# Patient Record
Sex: FEMALE | Race: OTHER | HISPANIC OR LATINO | URBAN - METROPOLITAN AREA
[De-identification: names, ages, dates, MRNs, and addresses within clinical notes are randomized per-mention and may not be internally consistent; named-entity substitution may affect disease eponyms.]

---

## 2022-04-11 ENCOUNTER — EMERGENCY (EMERGENCY)
Facility: HOSPITAL | Age: 22
LOS: 1 days | Discharge: HOME | End: 2022-04-11
Attending: EMERGENCY MEDICINE
Payer: MEDICAID

## 2022-04-11 VITALS
RESPIRATION RATE: 20 BRPM | SYSTOLIC BLOOD PRESSURE: 114 MMHG | TEMPERATURE: 98 F | WEIGHT: 139.99 LBS | DIASTOLIC BLOOD PRESSURE: 73 MMHG | HEART RATE: 83 BPM | HEIGHT: 65 IN | OXYGEN SATURATION: 99 %

## 2022-04-11 VITALS
RESPIRATION RATE: 16 BRPM | OXYGEN SATURATION: 100 % | SYSTOLIC BLOOD PRESSURE: 117 MMHG | DIASTOLIC BLOOD PRESSURE: 70 MMHG | HEART RATE: 60 BPM | TEMPERATURE: 98 F

## 2022-04-11 LAB
ALBUMIN SERPL ELPH-MCNC: 4.5 G/DL — SIGNIFICANT CHANGE UP (ref 3.3–5)
ALP SERPL-CCNC: 75 U/L — SIGNIFICANT CHANGE UP (ref 40–120)
ALT FLD-CCNC: 14 U/L — SIGNIFICANT CHANGE UP (ref 10–45)
ANION GAP SERPL CALC-SCNC: 11 MMOL/L — SIGNIFICANT CHANGE UP (ref 5–17)
APPEARANCE UR: CLEAR — SIGNIFICANT CHANGE UP
AST SERPL-CCNC: 21 U/L — SIGNIFICANT CHANGE UP (ref 10–40)
BACTERIA # UR AUTO: NEGATIVE — SIGNIFICANT CHANGE UP
BASOPHILS # BLD AUTO: 0.03 K/UL — SIGNIFICANT CHANGE UP (ref 0–0.2)
BASOPHILS NFR BLD AUTO: 0.4 % — SIGNIFICANT CHANGE UP (ref 0–2)
BILIRUB SERPL-MCNC: 0.2 MG/DL — SIGNIFICANT CHANGE UP (ref 0.2–1.2)
BILIRUB UR-MCNC: NEGATIVE — SIGNIFICANT CHANGE UP
BUN SERPL-MCNC: 14 MG/DL — SIGNIFICANT CHANGE UP (ref 7–23)
CALCIUM SERPL-MCNC: 9.4 MG/DL — SIGNIFICANT CHANGE UP (ref 8.4–10.5)
CHLORIDE SERPL-SCNC: 105 MMOL/L — SIGNIFICANT CHANGE UP (ref 96–108)
CO2 SERPL-SCNC: 23 MMOL/L — SIGNIFICANT CHANGE UP (ref 22–31)
COLOR SPEC: SIGNIFICANT CHANGE UP
CREAT SERPL-MCNC: 0.55 MG/DL — SIGNIFICANT CHANGE UP (ref 0.5–1.3)
DIFF PNL FLD: NEGATIVE — SIGNIFICANT CHANGE UP
EGFR: 134 ML/MIN/1.73M2 — SIGNIFICANT CHANGE UP
EOSINOPHIL # BLD AUTO: 0.02 K/UL — SIGNIFICANT CHANGE UP (ref 0–0.5)
EOSINOPHIL NFR BLD AUTO: 0.3 % — SIGNIFICANT CHANGE UP (ref 0–6)
EPI CELLS # UR: 3 /HPF — SIGNIFICANT CHANGE UP
FLUAV H1 2009 PAND RNA SPEC QL NAA+PROBE: DETECTED
GLUCOSE SERPL-MCNC: 96 MG/DL — SIGNIFICANT CHANGE UP (ref 70–99)
GLUCOSE UR QL: NEGATIVE — SIGNIFICANT CHANGE UP
HCG UR QL: NEGATIVE — SIGNIFICANT CHANGE UP
HCT VFR BLD CALC: 37.9 % — SIGNIFICANT CHANGE UP (ref 34.5–45)
HGB BLD-MCNC: 12.2 G/DL — SIGNIFICANT CHANGE UP (ref 11.5–15.5)
HYALINE CASTS # UR AUTO: 1 /LPF — SIGNIFICANT CHANGE UP (ref 0–2)
IMM GRANULOCYTES NFR BLD AUTO: 0.4 % — SIGNIFICANT CHANGE UP (ref 0–1.5)
KETONES UR-MCNC: NEGATIVE — SIGNIFICANT CHANGE UP
LEUKOCYTE ESTERASE UR-ACNC: NEGATIVE — SIGNIFICANT CHANGE UP
LIDOCAIN IGE QN: 26 U/L — SIGNIFICANT CHANGE UP (ref 7–60)
LYMPHOCYTES # BLD AUTO: 1.76 K/UL — SIGNIFICANT CHANGE UP (ref 1–3.3)
LYMPHOCYTES # BLD AUTO: 23.2 % — SIGNIFICANT CHANGE UP (ref 13–44)
MAGNESIUM SERPL-MCNC: 2 MG/DL — SIGNIFICANT CHANGE UP (ref 1.6–2.6)
MCHC RBC-ENTMCNC: 28 PG — SIGNIFICANT CHANGE UP (ref 27–34)
MCHC RBC-ENTMCNC: 32.2 GM/DL — SIGNIFICANT CHANGE UP (ref 32–36)
MCV RBC AUTO: 87.1 FL — SIGNIFICANT CHANGE UP (ref 80–100)
MONOCYTES # BLD AUTO: 0.62 K/UL — SIGNIFICANT CHANGE UP (ref 0–0.9)
MONOCYTES NFR BLD AUTO: 8.2 % — SIGNIFICANT CHANGE UP (ref 2–14)
NEUTROPHILS # BLD AUTO: 5.13 K/UL — SIGNIFICANT CHANGE UP (ref 1.8–7.4)
NEUTROPHILS NFR BLD AUTO: 67.5 % — SIGNIFICANT CHANGE UP (ref 43–77)
NITRITE UR-MCNC: NEGATIVE — SIGNIFICANT CHANGE UP
NRBC # BLD: 0 /100 WBCS — SIGNIFICANT CHANGE UP (ref 0–0)
PH UR: 7.5 — SIGNIFICANT CHANGE UP (ref 5–8)
PLATELET # BLD AUTO: 297 K/UL — SIGNIFICANT CHANGE UP (ref 150–400)
POTASSIUM SERPL-MCNC: 4.6 MMOL/L — SIGNIFICANT CHANGE UP (ref 3.5–5.3)
POTASSIUM SERPL-SCNC: 4.6 MMOL/L — SIGNIFICANT CHANGE UP (ref 3.5–5.3)
PROCALCITONIN SERPL-MCNC: 0.06 NG/ML — SIGNIFICANT CHANGE UP (ref 0.02–0.1)
PROT SERPL-MCNC: 7.3 G/DL — SIGNIFICANT CHANGE UP (ref 6–8.3)
PROT UR-MCNC: NEGATIVE — SIGNIFICANT CHANGE UP
RAPID RVP RESULT: DETECTED
RBC # BLD: 4.35 M/UL — SIGNIFICANT CHANGE UP (ref 3.8–5.2)
RBC # FLD: 12.3 % — SIGNIFICANT CHANGE UP (ref 10.3–14.5)
RBC CASTS # UR COMP ASSIST: 1 /HPF — SIGNIFICANT CHANGE UP (ref 0–4)
SARS-COV-2 RNA SPEC QL NAA+PROBE: SIGNIFICANT CHANGE UP
SODIUM SERPL-SCNC: 139 MMOL/L — SIGNIFICANT CHANGE UP (ref 135–145)
SP GR SPEC: 1.01 — SIGNIFICANT CHANGE UP (ref 1.01–1.02)
UROBILINOGEN FLD QL: NEGATIVE — SIGNIFICANT CHANGE UP
WBC # BLD: 7.59 K/UL — SIGNIFICANT CHANGE UP (ref 3.8–10.5)
WBC # FLD AUTO: 7.59 K/UL — SIGNIFICANT CHANGE UP (ref 3.8–10.5)
WBC UR QL: 5 /HPF — SIGNIFICANT CHANGE UP (ref 0–5)

## 2022-04-11 PROCEDURE — 80053 COMPREHEN METABOLIC PANEL: CPT

## 2022-04-11 PROCEDURE — 83735 ASSAY OF MAGNESIUM: CPT

## 2022-04-11 PROCEDURE — 96374 THER/PROPH/DIAG INJ IV PUSH: CPT

## 2022-04-11 PROCEDURE — 85025 COMPLETE CBC W/AUTO DIFF WBC: CPT

## 2022-04-11 PROCEDURE — 0225U NFCT DS DNA&RNA 21 SARSCOV2: CPT

## 2022-04-11 PROCEDURE — 99285 EMERGENCY DEPT VISIT HI MDM: CPT | Mod: 25

## 2022-04-11 PROCEDURE — 83690 ASSAY OF LIPASE: CPT

## 2022-04-11 PROCEDURE — 81001 URINALYSIS AUTO W/SCOPE: CPT

## 2022-04-11 PROCEDURE — 87086 URINE CULTURE/COLONY COUNT: CPT

## 2022-04-11 PROCEDURE — 71046 X-RAY EXAM CHEST 2 VIEWS: CPT

## 2022-04-11 PROCEDURE — 93010 ELECTROCARDIOGRAM REPORT: CPT

## 2022-04-11 PROCEDURE — 93005 ELECTROCARDIOGRAM TRACING: CPT

## 2022-04-11 PROCEDURE — 96375 TX/PRO/DX INJ NEW DRUG ADDON: CPT

## 2022-04-11 PROCEDURE — 81025 URINE PREGNANCY TEST: CPT

## 2022-04-11 PROCEDURE — 84145 PROCALCITONIN (PCT): CPT

## 2022-04-11 PROCEDURE — 36415 COLL VENOUS BLD VENIPUNCTURE: CPT

## 2022-04-11 PROCEDURE — 71046 X-RAY EXAM CHEST 2 VIEWS: CPT | Mod: 26

## 2022-04-11 RX ORDER — SODIUM CHLORIDE 9 MG/ML
1000 INJECTION, SOLUTION INTRAVENOUS
Refills: 0 | Status: DISCONTINUED | OUTPATIENT
Start: 2022-04-11 | End: 2022-04-15

## 2022-04-11 RX ORDER — FAMOTIDINE 10 MG/ML
20 INJECTION INTRAVENOUS ONCE
Refills: 0 | Status: COMPLETED | OUTPATIENT
Start: 2022-04-11 | End: 2022-04-11

## 2022-04-11 RX ORDER — KETOROLAC TROMETHAMINE 30 MG/ML
15 SYRINGE (ML) INJECTION ONCE
Refills: 0 | Status: DISCONTINUED | OUTPATIENT
Start: 2022-04-11 | End: 2022-04-11

## 2022-04-11 RX ORDER — ONDANSETRON 8 MG/1
4 TABLET, FILM COATED ORAL ONCE
Refills: 0 | Status: COMPLETED | OUTPATIENT
Start: 2022-04-11 | End: 2022-04-11

## 2022-04-11 RX ORDER — SODIUM CHLORIDE 9 MG/ML
1000 INJECTION, SOLUTION INTRAVENOUS ONCE
Refills: 0 | Status: COMPLETED | OUTPATIENT
Start: 2022-04-11 | End: 2022-04-11

## 2022-04-11 RX ADMIN — SODIUM CHLORIDE 4000 MILLILITER(S): 9 INJECTION, SOLUTION INTRAVENOUS at 17:26

## 2022-04-11 RX ADMIN — Medication 15 MILLIGRAM(S): at 17:16

## 2022-04-11 RX ADMIN — FAMOTIDINE 20 MILLIGRAM(S): 10 INJECTION INTRAVENOUS at 17:15

## 2022-04-11 RX ADMIN — ONDANSETRON 4 MILLIGRAM(S): 8 TABLET, FILM COATED ORAL at 17:15

## 2022-04-11 NOTE — ED PROVIDER NOTE - CARE PLAN
1 Principal Discharge DX:	URI with cough and congestion  Secondary Diagnosis:	Near syncope  Secondary Diagnosis:	Mild dehydration

## 2022-04-11 NOTE — ED PROVIDER NOTE - PROGRESS NOTE DETAILS
patient reassesed, feeling improved now ambulates without feeling faint, attempting PO at this time, awaiting urine preg and then stable for DC - Janay Harrison PA-C dc instructions with  fly [id number] discussed importance of hydration, symptom management and return precautions - Janay Harrison PA-C

## 2022-04-11 NOTE — ED PROVIDER NOTE - ATTENDING CONTRIBUTION TO CARE
------------ATTENDING NOTE------------   pt c/o 4 days of nasal congestion, clear rhinorrhea, unproductive cough, nausea, decreased PO, diffuse muscle aches, subjective fevers, likely viral process, dehydrated, feeling lightheaded at times standing, no underlying medical disease, awaiting labs/imaging and close reassessments -->  - Leander Ruvalcaba MD   ----------------------------------------------

## 2022-04-11 NOTE — ED PROVIDER NOTE - NSFOLLOWUPINSTRUCTIONS_ED_ALL_ED_FT
stay hydrated  take tylenol for fever, take as indicated on packaging and ensure you read the side effects  take motrin for fever and body aches, take as indicated on packaging and ensure you read the side effects  read the attached information regarding viral illnesses provided for you in Welsh  someone will call you with your viral panel results  return to the ED for severe worsening headache, profuse vomiting or inability to tolerate food and drink, fevers not controlled by tylenol and motrin or new concerning symptoms    Mantente hidratado    tome tylenol para la fiebre, tómelo geneva se indica en el empaque y asegúrese de leer los efectos secundarios    tome motrin para la fiebre y los chelsea corporales, tómelo geneva se indica en el empaque y asegúrese de leer los efectos secundarios    russel la información adjunta sobre enfermedades virales proporcionada para usted en español    alguien te llamará con los resultados de tu panel viral    volver al servicio de urgencias por dolor de stan que empeora gravemente, vómitos profusos o incapacidad para tolerar alimentos y bebidas, fiebres que no se controlan con tylenol y motrin o nuevos síntomas preocupantes stay hydrated  take tylenol for fever, take as indicated on packaging and ensure you read the side effects  take motrin for fever and body aches, take as indicated on packaging and ensure you read the side effects  read the attached information regarding viral illnesses provided for you in Turkish  return to the ED for severe worsening headache, profuse vomiting or inability to tolerate food and drink, fevers not controlled by tylenol and motrin or new concerning symptoms    Mantente hidratado  tome tylenol para la fiebre, tómelo geneva se indica en el empaque y asegúrese de leer los efectos secundarios  tome motrin para la fiebre y los chelsea corporales, tómelo geneva se indica en el empaque y asegúrese de leer los efectos secundarios  russel la información adjunta sobre enfermedades virales proporcionada para usted en español  volver al servicio de urgencias por dolor de stan que empeora gravemente, vómitos profusos o incapacidad para tolerar alimentos y bebidas, fiebres que no se controlan con tylenol y motrin o nuevos síntomas preocupantes

## 2022-04-11 NOTE — ED PROVIDER NOTE - WET READ LAUNCH FT
I have reviewed discharge instructions with the parent. The parent verbalized understanding. Patient armband removed and shredded There are no Wet Read(s) to document. There is 1 Wet Read(s) to document.

## 2022-04-11 NOTE — ED PROVIDER NOTE - OBJECTIVE STATEMENT
20 y/o female no pmhx, no daily meds presents to the ED with myalgias, tactile fevers, headaches, sore throat, dry cough and nausea for 4 days. reports feeling faint on standing. no sick contacts or recent travel. covid vaccinated, no flu vaccine this year. patient did not take tylenol or motrin at home. 20 y/o female no pmhx, no daily meds presents to the ED with myalgias, tactile fevers, headaches, sore throat, dry cough and nausea for 4 days. reports feeling faint on standing. no sick contacts or recent travel. covid vaccinated, no flu vaccine this year. patient did not take tylenol or motrin at home.     : Shalonda [id number]

## 2022-04-11 NOTE — ED PROVIDER NOTE - NS ED ATTENDING STATEMENT MOD
This was a shared visit with the JULIAN. I reviewed and verified the documentation and independently performed the documented:

## 2022-04-11 NOTE — ED ADULT NURSE NOTE - NSIMPLEMENTINTERV_GEN_ALL_ED
Implemented All Universal Safety Interventions:  [place] to call system. Call bell, personal items and telephone within reach. Instruct patient to call for assistance. Room bathroom lighting operational. Non-slip footwear when patient is off stretcher. Physically safe environment: no spills, clutter or unnecessary equipment. Stretcher in lowest position, wheels locked, appropriate side rails in place.

## 2022-04-11 NOTE — ED PROVIDER NOTE - PATIENT PORTAL LINK FT
You can access the FollowMyHealth Patient Portal offered by [facility] by registering at the following website: [email or web address]. By joining [employer]’s FollowMyHealth portal, you will also be able to view your health information using other applications (apps) compatible with our system.

## 2022-04-11 NOTE — ED ADULT NURSE NOTE - OBJECTIVE STATEMENT
pt is a 20yo female no PMH, presenting to the ED complaining of generalized body pain and fatigue. pt complaining of fevers, chills, headaches, sore throat, and nausea for the past 4 days. pt A&Ox3 gross neuro intact, pt is Andorran speaking with no difficulty speaking in complete sentences, pulses x 4, gonzalez x4, abdomen soft nontender nondistended, skin intact. pt complaining of all over body pain.

## 2022-04-12 LAB
CULTURE RESULTS: SIGNIFICANT CHANGE UP
SPECIMEN SOURCE: SIGNIFICANT CHANGE UP

## 2022-10-18 ENCOUNTER — OUTPATIENT (OUTPATIENT)
Dept: OUTPATIENT SERVICES | Facility: HOSPITAL | Age: 22
LOS: 1 days | End: 2022-10-18
Payer: SELF-PAY

## 2022-10-18 ENCOUNTER — APPOINTMENT (OUTPATIENT)
Dept: OBGYN | Facility: CLINIC | Age: 22
End: 2022-10-18

## 2022-10-18 ENCOUNTER — LABORATORY RESULT (OUTPATIENT)
Age: 22
End: 2022-10-18

## 2022-10-18 VITALS
HEIGHT: 61 IN | WEIGHT: 120 LBS | DIASTOLIC BLOOD PRESSURE: 70 MMHG | BODY MASS INDEX: 22.66 KG/M2 | SYSTOLIC BLOOD PRESSURE: 104 MMHG

## 2022-10-18 DIAGNOSIS — N76.0 ACUTE VAGINITIS: ICD-10-CM

## 2022-10-18 PROCEDURE — 99203 OFFICE O/P NEW LOW 30 MIN: CPT | Mod: 25

## 2022-10-18 PROCEDURE — 88141 CYTOPATH C/V INTERPRET: CPT

## 2022-10-18 NOTE — DISCUSSION/SUMMARY
[FreeTextEntry1] : 21yo - here for confirmation of pregnancy\par \par - Viable IUP  +FH  CRl )7.0wks) consistent with LMP (7.5wks)\par - [ ]pnv given\par - [ ]pap/ gc/ct collected\par \par RTC for PCAP\par \par [place], PAC

## 2022-10-18 NOTE — PHYSICAL EXAM
[FreeTextEntry1] : PA-S,   FOB present as well [Labia Majora] : normal [Labia Minora] : normal [No Bleeding] : There was no active vaginal bleeding [Normal] : normal [Tenderness] : nontender [Uterine Adnexae] : normal

## 2022-10-18 NOTE — PROCEDURE
[Cervical Pap Smear] : cervical Pap smear [Liquid Base] : liquid base [GC Chlamydia Culture] : GC Chlamydia Culture [Transvaginal OB Sonogram] : Transvaginal OB Sonogram [Intrauterine Pregnancy] : intrauterine pregnancy [Yolk Sac] : yolk sac present [Fetal Heart] : fetal heart present [CRL: ___ (mm)] : CRL - [unfilled]Umm [Current GA by Sonogram: ___ (wks)] : Current GA by Sonogram: [unfilled]Uwks [___ day(s)] : [unfilled] days

## 2022-10-18 NOTE — REASON FOR VISIT
[Initial] : an initial consultation for [[employer] ] : provided by [employer]   [Time Spent: ____ minutes] : Total time spent using  services: [unfilled] minutes. The patient's primary language is not English thus required  services. [TWNoteComboBox1] : Moldovan

## 2022-10-18 NOTE — HISTORY OF PRESENT ILLNESS
[FreeTextEntry1] : 21yo  (LMP 22) presents as new pt to confirm pregnancy.  Unplanned, desired.  pt with regular monthly menses.  Pt denies VB.  First gyn exam\par \par gynhx: never had pap, denies stds\par Pmhx: denies\par Shx: denies\par Meds: denies\par ALL: KNDA\par Sochx: denies tob/etoh/drugs\par Famhx: denies all

## 2022-10-19 LAB
C TRACH RRNA SPEC QL NAA+PROBE: SIGNIFICANT CHANGE UP
N GONORRHOEA RRNA SPEC QL NAA+PROBE: SIGNIFICANT CHANGE UP
SPECIMEN SOURCE: SIGNIFICANT CHANGE UP

## 2022-10-20 DIAGNOSIS — Z32.00 ENCOUNTER FOR PREGNANCY TEST, RESULT UNKNOWN: ICD-10-CM

## 2022-10-20 DIAGNOSIS — Z11.3 ENCOUNTER FOR SCREENING FOR INFECTIONS WITH A PREDOMINANTLY SEXUAL MODE OF TRANSMISSION: ICD-10-CM

## 2022-10-24 ENCOUNTER — LABORATORY RESULT (OUTPATIENT)
Age: 22
End: 2022-10-24

## 2022-10-24 LAB — CYTOLOGY SPEC DOC CYTO: SIGNIFICANT CHANGE UP

## 2022-10-24 PROCEDURE — 87624 HPV HI-RISK TYP POOLED RSLT: CPT

## 2022-10-24 PROCEDURE — T1013: CPT

## 2022-10-24 PROCEDURE — 87491 CHLMYD TRACH DNA AMP PROBE: CPT

## 2022-10-24 PROCEDURE — 87591 N.GONORRHOEAE DNA AMP PROB: CPT

## 2022-10-24 PROCEDURE — 88175 CYTOPATH C/V AUTO FLUID REDO: CPT

## 2022-10-24 PROCEDURE — G0463: CPT

## 2022-10-24 PROCEDURE — 87625 HPV TYPES 16 & 18 ONLY: CPT

## 2022-11-03 ENCOUNTER — NON-APPOINTMENT (OUTPATIENT)
Age: 22
End: 2022-11-03

## 2022-11-09 ENCOUNTER — APPOINTMENT (OUTPATIENT)
Dept: OBGYN | Facility: CLINIC | Age: 22
End: 2022-11-09
Payer: MEDICAID

## 2022-11-09 ENCOUNTER — NON-APPOINTMENT (OUTPATIENT)
Age: 22
End: 2022-11-09

## 2022-11-09 ENCOUNTER — LABORATORY RESULT (OUTPATIENT)
Age: 22
End: 2022-11-09

## 2022-11-09 ENCOUNTER — OUTPATIENT (OUTPATIENT)
Dept: OUTPATIENT SERVICES | Facility: HOSPITAL | Age: 22
LOS: 1 days | End: 2022-11-09
Payer: MEDICAID

## 2022-11-09 VITALS — DIASTOLIC BLOOD PRESSURE: 60 MMHG | WEIGHT: 115 LBS | SYSTOLIC BLOOD PRESSURE: 100 MMHG

## 2022-11-09 DIAGNOSIS — Z34.80 ENCOUNTER FOR SUPERVISION OF OTHER NORMAL PREGNANCY, UNSPECIFIED TRIMESTER: ICD-10-CM

## 2022-11-09 DIAGNOSIS — Z32.00 ENCOUNTER FOR PREGNANCY TEST, RESULT UNKNOWN: ICD-10-CM

## 2022-11-09 DIAGNOSIS — O02.1 MISSED ABORTION: ICD-10-CM

## 2022-11-09 PROCEDURE — 85027 COMPLETE CBC AUTOMATED: CPT

## 2022-11-09 PROCEDURE — G0463: CPT

## 2022-11-09 PROCEDURE — 86901 BLOOD TYPING SEROLOGIC RH(D): CPT

## 2022-11-09 PROCEDURE — 90471 IMMUNIZATION ADMIN: CPT | Mod: NC

## 2022-11-09 PROCEDURE — 76830 TRANSVAGINAL US NON-OB: CPT | Mod: 26,NC

## 2022-11-09 PROCEDURE — 86900 BLOOD TYPING SEROLOGIC ABO: CPT

## 2022-11-09 PROCEDURE — 99213 OFFICE O/P EST LOW 20 MIN: CPT | Mod: 25

## 2022-11-09 PROCEDURE — 99213 OFFICE O/P EST LOW 20 MIN: CPT | Mod: 1L

## 2022-11-09 PROCEDURE — 86850 RBC ANTIBODY SCREEN: CPT

## 2022-11-09 PROCEDURE — 84702 CHORIONIC GONADOTROPIN TEST: CPT

## 2022-11-09 NOTE — HISTORY OF PRESENT ILLNESS
[Definite:  ___ (Date)] : the last menstrual period was [unfilled] [Sexually Active] : is sexually active [Monogamous] : is monogamous [Contraception] : does not use contraception

## 2022-11-09 NOTE — BEGINNING OF VISIT
[Patient] : patient [] :  [[employer] ] : provided by [employer]   [Time Spent: ____ minutes] : Total time spent using  services: [unfilled] minutes. The patient's primary language is not English thus required  services. [Interpreters_IDNumber] : [id number] [TWNoteComboBox1] : Ethiopian

## 2022-11-10 LAB
HCG SERPL-ACNC: HIGH MIU/ML
HCT VFR BLD CALC: 37.3 % — SIGNIFICANT CHANGE UP (ref 34.5–45)
HGB BLD-MCNC: 12 G/DL — SIGNIFICANT CHANGE UP (ref 11.5–15.5)
MCHC RBC-ENTMCNC: 28.5 PG — SIGNIFICANT CHANGE UP (ref 27–34)
MCHC RBC-ENTMCNC: 32.2 GM/DL — SIGNIFICANT CHANGE UP (ref 32–36)
MCV RBC AUTO: 88.6 FL — SIGNIFICANT CHANGE UP (ref 80–100)
PLATELET # BLD AUTO: 311 K/UL — SIGNIFICANT CHANGE UP (ref 150–400)
RBC # BLD: 4.21 M/UL — SIGNIFICANT CHANGE UP (ref 3.8–5.2)
RBC # FLD: 13.3 % — SIGNIFICANT CHANGE UP (ref 10.3–14.5)
WBC # BLD: 6.87 K/UL — SIGNIFICANT CHANGE UP (ref 3.8–10.5)
WBC # FLD AUTO: 6.87 K/UL — SIGNIFICANT CHANGE UP (ref 3.8–10.5)

## 2022-11-14 ENCOUNTER — OUTPATIENT (OUTPATIENT)
Dept: OUTPATIENT SERVICES | Facility: HOSPITAL | Age: 22
LOS: 1 days | End: 2022-11-14
Payer: MEDICAID

## 2022-11-14 ENCOUNTER — LABORATORY RESULT (OUTPATIENT)
Age: 22
End: 2022-11-14

## 2022-11-14 ENCOUNTER — TRANSCRIPTION ENCOUNTER (OUTPATIENT)
Age: 22
End: 2022-11-14

## 2022-11-14 ENCOUNTER — APPOINTMENT (OUTPATIENT)
Dept: OBGYN | Facility: CLINIC | Age: 22
End: 2022-11-14

## 2022-11-14 VITALS
WEIGHT: 117.95 LBS | HEIGHT: 61 IN | SYSTOLIC BLOOD PRESSURE: 105 MMHG | OXYGEN SATURATION: 100 % | RESPIRATION RATE: 18 BRPM | DIASTOLIC BLOOD PRESSURE: 68 MMHG | TEMPERATURE: 99 F | HEART RATE: 66 BPM

## 2022-11-14 VITALS — SYSTOLIC BLOOD PRESSURE: 104 MMHG | WEIGHT: 115 LBS | DIASTOLIC BLOOD PRESSURE: 62 MMHG

## 2022-11-14 DIAGNOSIS — Z01.818 ENCOUNTER FOR OTHER PREPROCEDURAL EXAMINATION: ICD-10-CM

## 2022-11-14 DIAGNOSIS — K08.409 PARTIAL LOSS OF TEETH, UNSPECIFIED CAUSE, UNSPECIFIED CLASS: Chronic | ICD-10-CM

## 2022-11-14 DIAGNOSIS — O02.1 MISSED ABORTION: ICD-10-CM

## 2022-11-14 DIAGNOSIS — N76.0 ACUTE VAGINITIS: ICD-10-CM

## 2022-11-14 LAB
BLD GP AB SCN SERPL QL: NEGATIVE — SIGNIFICANT CHANGE UP
HCT VFR BLD CALC: 35.2 % — SIGNIFICANT CHANGE UP (ref 34.5–45)
HGB BLD-MCNC: 11.7 G/DL — SIGNIFICANT CHANGE UP (ref 11.5–15.5)
MCHC RBC-ENTMCNC: 28.7 PG — SIGNIFICANT CHANGE UP (ref 27–34)
MCHC RBC-ENTMCNC: 33.2 GM/DL — SIGNIFICANT CHANGE UP (ref 32–36)
MCV RBC AUTO: 86.5 FL — SIGNIFICANT CHANGE UP (ref 80–100)
NRBC # BLD: 0 /100 WBCS — SIGNIFICANT CHANGE UP (ref 0–0)
PLATELET # BLD AUTO: 284 K/UL — SIGNIFICANT CHANGE UP (ref 150–400)
RBC # BLD: 4.07 M/UL — SIGNIFICANT CHANGE UP (ref 3.8–5.2)
RBC # FLD: 12.9 % — SIGNIFICANT CHANGE UP (ref 10.3–14.5)
RH IG SCN BLD-IMP: POSITIVE — SIGNIFICANT CHANGE UP
SARS-COV-2 RNA SPEC QL NAA+PROBE: SIGNIFICANT CHANGE UP
WBC # BLD: 6.22 K/UL — SIGNIFICANT CHANGE UP (ref 3.8–10.5)
WBC # FLD AUTO: 6.22 K/UL — SIGNIFICANT CHANGE UP (ref 3.8–10.5)

## 2022-11-14 PROCEDURE — 86850 RBC ANTIBODY SCREEN: CPT

## 2022-11-14 PROCEDURE — 86901 BLOOD TYPING SEROLOGIC RH(D): CPT

## 2022-11-14 PROCEDURE — 99214 OFFICE O/P EST MOD 30 MIN: CPT | Mod: GC,25

## 2022-11-14 PROCEDURE — 85027 COMPLETE CBC AUTOMATED: CPT

## 2022-11-14 PROCEDURE — U0003: CPT

## 2022-11-14 PROCEDURE — G0463: CPT

## 2022-11-14 PROCEDURE — 76815 OB US LIMITED FETUS(S): CPT

## 2022-11-14 PROCEDURE — 86900 BLOOD TYPING SEROLOGIC ABO: CPT

## 2022-11-14 PROCEDURE — U0005: CPT

## 2022-11-14 PROCEDURE — 76815 OB US LIMITED FETUS(S): CPT | Mod: 26,NC

## 2022-11-14 PROCEDURE — 36415 COLL VENOUS BLD VENIPUNCTURE: CPT

## 2022-11-14 RX ORDER — LIDOCAINE HCL 20 MG/ML
0.2 VIAL (ML) INJECTION ONCE
Refills: 0 | Status: DISCONTINUED | OUTPATIENT
Start: 2022-11-15 | End: 2022-11-29

## 2022-11-14 RX ORDER — SODIUM CHLORIDE 9 MG/ML
1000 INJECTION, SOLUTION INTRAVENOUS
Refills: 0 | Status: DISCONTINUED | OUTPATIENT
Start: 2022-11-15 | End: 2022-11-29

## 2022-11-14 NOTE — H&P PST ADULT - HISTORY OF PRESENT ILLNESS
22yr old female  LMP 22 presents to PST for a scheduled D&C w/ Ultrasound Guidance on 11/15/22 for a missed ; no fetal heart beat @7wks. No PMH. No PSH. Denies known hx of COVID or recent fevers, chills, cough, chest pain or SOB and feels well otherwise. Vaccinated x2. COVID testing completed in PST today.

## 2022-11-14 NOTE — REASON FOR VISIT
[[employer] ] : provided by [employer]   [Follow-Up] : a follow-up evaluation of [FreeTextEntry2] : missed  [Interpreters_IDNumber] : [id number] [Interpreters_FullName] : Ursula

## 2022-11-14 NOTE — H&P PST ADULT - NSANTHOSAYNRD_GEN_A_CORE
No. SEBLE screening performed.  STOP BANG Legend: 0-2 = LOW Risk; 3-4 = INTERMEDIATE Risk; 5-8 = HIGH Risk

## 2022-11-14 NOTE — H&P PST ADULT - PROBLEM SELECTOR PLAN 1
Scheduled for sx on 11/15/2022. Surgical instructions reviewed w/ pt, COVID testing completed in PST today.

## 2022-11-14 NOTE — H&P PST ADULT - NSANTHSNORERD_ENT_A_CORE
GENITOURINARY - ADULT    • Absence of urinary retention Progressing        Impaired Functional Mobility    • Achieve highest/safest level of mobility/gait Progressing        Impaired Swallowing    • Minimize aspiration risk Progressing        PAIN - ADULT No

## 2022-11-14 NOTE — HISTORY OF PRESENT ILLNESS
[FreeTextEntry1] : 23yo  @ 11 4/7 weeks measuring 7 0/7 weeks on u/s  diagnosed with missed  on  presenting with her partner for pre-surgical evaluation. Patient denies vaginal bleeding, fevers, chills, N/V. She has intermittent sharp abdominal pain but has not taken any medication for it. She has a D&C scheduled for tomorrow 11/15. She has attended PST. Patient accompanied by her partner today.\par \par \par GYNhx:\par denies abnl pap smears\par  denies fibroids/cysts/enodmetriosis\par denies STIs

## 2022-11-15 ENCOUNTER — OUTPATIENT (OUTPATIENT)
Dept: OUTPATIENT SERVICES | Facility: HOSPITAL | Age: 22
LOS: 1 days | End: 2022-11-15
Payer: MEDICAID

## 2022-11-15 ENCOUNTER — RESULT REVIEW (OUTPATIENT)
Age: 22
End: 2022-11-15

## 2022-11-15 ENCOUNTER — TRANSCRIPTION ENCOUNTER (OUTPATIENT)
Age: 22
End: 2022-11-15

## 2022-11-15 ENCOUNTER — APPOINTMENT (OUTPATIENT)
Dept: OBGYN | Facility: CLINIC | Age: 22
End: 2022-11-15

## 2022-11-15 VITALS
HEART RATE: 50 BPM | SYSTOLIC BLOOD PRESSURE: 121 MMHG | OXYGEN SATURATION: 100 % | RESPIRATION RATE: 16 BRPM | DIASTOLIC BLOOD PRESSURE: 61 MMHG

## 2022-11-15 VITALS
SYSTOLIC BLOOD PRESSURE: 93 MMHG | RESPIRATION RATE: 18 BRPM | HEIGHT: 61 IN | OXYGEN SATURATION: 100 % | HEART RATE: 64 BPM | DIASTOLIC BLOOD PRESSURE: 61 MMHG | WEIGHT: 117.95 LBS | TEMPERATURE: 98 F

## 2022-11-15 DIAGNOSIS — O02.1 MISSED ABORTION: ICD-10-CM

## 2022-11-15 DIAGNOSIS — K08.409 PARTIAL LOSS OF TEETH, UNSPECIFIED CAUSE, UNSPECIFIED CLASS: Chronic | ICD-10-CM

## 2022-11-15 DIAGNOSIS — Z01.818 ENCOUNTER FOR OTHER PREPROCEDURAL EXAMINATION: ICD-10-CM

## 2022-11-15 LAB
BLD GP AB SCN SERPL QL: NEGATIVE — SIGNIFICANT CHANGE UP
RH IG SCN BLD-IMP: POSITIVE — SIGNIFICANT CHANGE UP

## 2022-11-15 PROCEDURE — 59820 CARE OF MISCARRIAGE: CPT

## 2022-11-15 PROCEDURE — 76998 US GUIDE INTRAOP: CPT | Mod: 26

## 2022-11-15 PROCEDURE — 86850 RBC ANTIBODY SCREEN: CPT

## 2022-11-15 PROCEDURE — 88264 CHROMOSOME ANALYSIS 20-25: CPT

## 2022-11-15 PROCEDURE — 88233 TISSUE CULTURE SKIN/BIOPSY: CPT

## 2022-11-15 PROCEDURE — 86901 BLOOD TYPING SEROLOGIC RH(D): CPT

## 2022-11-15 PROCEDURE — 88305 TISSUE EXAM BY PATHOLOGIST: CPT | Mod: 26

## 2022-11-15 PROCEDURE — 86900 BLOOD TYPING SEROLOGIC ABO: CPT

## 2022-11-15 PROCEDURE — 88280 CHROMOSOME KARYOTYPE STUDY: CPT

## 2022-11-15 PROCEDURE — 88305 TISSUE EXAM BY PATHOLOGIST: CPT

## 2022-11-15 RX ORDER — FENTANYL CITRATE 50 UG/ML
25 INJECTION INTRAVENOUS
Refills: 0 | Status: DISCONTINUED | OUTPATIENT
Start: 2022-11-15 | End: 2022-11-15

## 2022-11-15 RX ORDER — ONDANSETRON 8 MG/1
4 TABLET, FILM COATED ORAL ONCE
Refills: 0 | Status: DISCONTINUED | OUTPATIENT
Start: 2022-11-15 | End: 2022-11-29

## 2022-11-15 RX ADMIN — SODIUM CHLORIDE 100 MILLILITER(S): 9 INJECTION, SOLUTION INTRAVENOUS at 12:38

## 2022-11-15 NOTE — ASU DISCHARGE PLAN (ADULT/PEDIATRIC) - NURSING INSTRUCTIONS
Next dose of Tylenol will be on or after ____7:45pm_______ ,today/tonight and every 6 hours afterwards for pain management, do not take any Tylenol containing products until this time. Your first dose of Tylenol was given at _1:45pm__________. Do not exceed more than 4000mg of Tylenol in one 24 hour setting. If no contraindications, you may alternate with Ibuprofen 3 hours after dose of Tylenol. Ibuprofen can be taken every 6 hours. Next dose of ibuprofen can be taken at/after---8pm--- if needed for pain. First dose was given at 2pm.

## 2022-11-15 NOTE — ASU DISCHARGE PLAN (ADULT/PEDIATRIC) - CARE PROVIDER_API CALL
Lucille Tran)  OBSGYN  General  [address], [address]  [place] [address]  Phone: [phone]  Fax: [phone]  Established Patient  Follow Up Time: 2 weeks

## 2022-11-15 NOTE — BRIEF OPERATIVE NOTE - NSICDXBRIEFPROCEDURE_GEN_ALL_CORE_FT
PROCEDURES:  Dilation and curettage, uterus, with ultrasound guidance 15-Nov-2022 14:22:05  Belia Kahn

## 2022-11-15 NOTE — ASU DISCHARGE PLAN (ADULT/PEDIATRIC) - ASU DC SPECIAL INSTRUCTIONSFT
Discharge Instructions:  1. Diet: advance as tolerated  2. Activity limited by:   - no running, heavy lifting, strenuous exercise,   - nothing in vagina (bath, swim, intercourse, douching, tampons) for 2 weeks  3. Medications:   - Ibuprofen 600mg every 6 hours as needed for pain  - Acetaminophen 975mg every 6 hours as needed for pain  4. Follow-up appointment - 2 weeks. Please call the office upon discharge to schedule your appointment if you do not have one already.   5. Precautions:  - Call the office or go to the ED if you have any of the followin) Fever >100.4 that does not resolve  2) Intractable pain  3) Heavy bleeding

## 2022-11-15 NOTE — BRIEF OPERATIVE NOTE - OPERATION/FINDINGS
EUA: Anteverted uterus approx 8 weeks in size. External genitalia wnl. No adnexal masses  Manual vaccuum aspiration performed in standard fashion under sono guidance. Products of conception accounted for and appropriate for gestational age of demise.   Thin endometrial stripe noted at end of procedure

## 2022-11-15 NOTE — ASU PATIENT PROFILE, ADULT - FALL HARM RISK - UNIVERSAL INTERVENTIONS
Bed in lowest position, wheels locked, appropriate side rails in place/Call bell, personal items and telephone in reach/Instruct patient to call for assistance before getting out of bed or chair/Non-slip footwear when patient is out of bed/[place] to call system/Physically safe environment - no spills, clutter or unnecessary equipment/Purposeful Proactive Rounding/Room/bathroom lighting operational, light cord in reach

## 2022-11-15 NOTE — ASU DISCHARGE PLAN (ADULT/PEDIATRIC) - NS MD DC FALL RISK RISK
For information on Fall & Injury Prevention, visit: [email or web address] OR  [email or web address] OR  https://www.cdc.gov/steadi/patient.html

## 2022-11-17 ENCOUNTER — NON-APPOINTMENT (OUTPATIENT)
Age: 22
End: 2022-11-17

## 2022-11-19 DIAGNOSIS — O02.1 MISSED ABORTION: ICD-10-CM

## 2022-11-25 LAB — SURGICAL PATHOLOGY STUDY: SIGNIFICANT CHANGE UP

## 2022-12-01 LAB — CHROM ANALY OVERALL INTERP SPEC-IMP: SIGNIFICANT CHANGE UP

## 2022-12-10 NOTE — PROCEDURE
[Intrauterine Pregnancy] : intrauterine pregnancy [Yolk Sac] : yolk sac present [Fetal Heart] : no fetal heart [Current GA by Sonogram: ___ (wks)] : Current GA by Sonogram: [unfilled]Uwks [___ day(s)] : [unfilled] days Airway patent, Nasal mucosa clear. Mouth with normal mucosa. [FreeTextEntry1] : MAB

## 2022-12-27 ENCOUNTER — NON-APPOINTMENT (OUTPATIENT)
Age: 22
End: 2022-12-27

## 2022-12-28 LAB
MISCELLANEOUS TEST: NORMAL
PROC NAME: NORMAL

## 2023-01-04 ENCOUNTER — APPOINTMENT (OUTPATIENT)
Dept: OBGYN | Facility: CLINIC | Age: 23
End: 2023-01-04
Payer: MEDICAID

## 2023-01-04 VITALS
DIASTOLIC BLOOD PRESSURE: 68 MMHG | RESPIRATION RATE: 18 BRPM | OXYGEN SATURATION: 100 % | SYSTOLIC BLOOD PRESSURE: 102 MMHG | HEART RATE: 60 BPM

## 2023-01-04 PROCEDURE — 99214 OFFICE O/P EST MOD 30 MIN: CPT

## 2023-01-11 ENCOUNTER — OUTPATIENT (OUTPATIENT)
Dept: OUTPATIENT SERVICES | Facility: HOSPITAL | Age: 23
LOS: 1 days | End: 2023-01-11
Payer: MEDICAID

## 2023-01-11 ENCOUNTER — APPOINTMENT (OUTPATIENT)
Dept: OBGYN | Facility: CLINIC | Age: 23
End: 2023-01-11
Payer: MEDICAID

## 2023-01-11 VITALS — SYSTOLIC BLOOD PRESSURE: 108 MMHG | DIASTOLIC BLOOD PRESSURE: 70 MMHG | WEIGHT: 124 LBS

## 2023-01-11 DIAGNOSIS — R87.610 ATYPICAL SQUAMOUS CELLS OF UNDETERMINED SIGNIFICANCE ON CYTOLOGIC SMEAR OF CERVIX (ASC-US): ICD-10-CM

## 2023-01-11 DIAGNOSIS — Z34.90 ENCOUNTER FOR SUPERVISION OF NORMAL PREGNANCY, UNSPECIFIED, UNSPECIFIED TRIMESTER: ICD-10-CM

## 2023-01-11 DIAGNOSIS — K08.409 PARTIAL LOSS OF TEETH, UNSPECIFIED CAUSE, UNSPECIFIED CLASS: Chronic | ICD-10-CM

## 2023-01-11 DIAGNOSIS — O02.1 MISSED ABORTION: ICD-10-CM

## 2023-01-11 DIAGNOSIS — N76.0 ACUTE VAGINITIS: ICD-10-CM

## 2023-01-11 DIAGNOSIS — Z34.91 ENCOUNTER FOR SUPERVISION OF NORMAL PREGNANCY, UNSPECIFIED, FIRST TRIMESTER: ICD-10-CM

## 2023-01-11 DIAGNOSIS — Z09 ENCOUNTER FOR FOLLOW-UP EXAMINATION AFTER COMPLETED TREATMENT FOR CONDITIONS OTHER THAN MALIGNANT NEOPLASM: ICD-10-CM

## 2023-01-11 PROCEDURE — G0463: CPT

## 2023-01-11 PROCEDURE — 99213 OFFICE O/P EST LOW 20 MIN: CPT | Mod: GC

## 2023-01-12 DIAGNOSIS — R87.610 ATYPICAL SQUAMOUS CELLS OF UNDETERMINED SIGNIFICANCE ON CYTOLOGIC SMEAR OF CERVIX (ASC-US): ICD-10-CM

## 2023-01-12 NOTE — DISCUSSION/SUMMARY
[FreeTextEntry1] : 23yo  LMP 22 coming in for annual visit. Patient reports intermittent sharp LUQ pain since her D&C for missed  11/15/22.\par \par #HCM\par - 10/2022 pap ASCUS with neg HRHPV\par [] repeat pap in 1 year\par - using condoms for contraception\par \par #abdominal pain\par - Likely related to gas. Patient denies pain currently. \par - Recommend simethicone and pepcid\par \par d/w Dr. Kruse\par JAMES Augustin, PGY2

## 2023-01-12 NOTE — HISTORY OF PRESENT ILLNESS
[FreeTextEntry1] : 22 year  LMP 22 coming in for annual. Patient reports intermittent sharp LUQ pain ever since her D&C. She has not taken pain medication for it. She does not have the pain today. She is back to her regular activities s/p D&C on 11/15/22 for missed  at 11wga. Patient feels well and denies symptoms including: dysuria, abdominal pain, vaginal itching, burning, abnormal discharge. \par \par Last pap: Oct 2022 - ASCUS with neg HRHPV\par Last mammogram: not indicated\par Last declines STI screening. Patient satisfied with current form of birth control (condoms)\par \par Ob Hx: missed  s/p D&C 11/15/22\par Gyn Hx: denies\par Sexual Hx: monogamous with \par Med Hx: denies\par Surg Hx: denies\par Meds: none\par Allergies: NKDA

## 2023-01-12 NOTE — REASON FOR VISIT
[[employer] ] : provided by [employer]   [Interpreters_IDNumber] : [id number] [Interpreters_FullName] : Alessandra [TWNoteComboBox1] : Paraguayan

## 2023-01-12 NOTE — PHYSICAL EXAM
[Chaperone Present] : A chaperone was present in the examining room during all aspects of the physical examination [Soft] : soft [Non-tender] : non-tender [Non-distended] : non-distended [Labia Majora] : normal [Labia Minora] : normal [Normal] : normal [Uterine Adnexae] : normal [FreeTextEntry1] : Jacob

## 2023-03-22 ENCOUNTER — EMERGENCY (EMERGENCY)
Facility: HOSPITAL | Age: 23
LOS: 1 days | Discharge: HOME | End: 2023-03-22
Attending: EMERGENCY MEDICINE
Payer: MEDICAID

## 2023-03-22 VITALS
RESPIRATION RATE: 17 BRPM | OXYGEN SATURATION: 99 % | WEIGHT: 119.93 LBS | SYSTOLIC BLOOD PRESSURE: 97 MMHG | TEMPERATURE: 98 F | DIASTOLIC BLOOD PRESSURE: 63 MMHG | HEIGHT: 61 IN | HEART RATE: 73 BPM

## 2023-03-22 VITALS
TEMPERATURE: 98 F | SYSTOLIC BLOOD PRESSURE: 100 MMHG | DIASTOLIC BLOOD PRESSURE: 51 MMHG | RESPIRATION RATE: 18 BRPM | OXYGEN SATURATION: 99 % | HEART RATE: 70 BPM

## 2023-03-22 DIAGNOSIS — K08.409 PARTIAL LOSS OF TEETH, UNSPECIFIED CAUSE, UNSPECIFIED CLASS: Chronic | ICD-10-CM

## 2023-03-22 LAB
APPEARANCE UR: ABNORMAL
BACTERIA # UR AUTO: ABNORMAL
BILIRUB UR-MCNC: NEGATIVE — SIGNIFICANT CHANGE UP
COLOR SPEC: ABNORMAL
DIFF PNL FLD: ABNORMAL
EPI CELLS # UR: ABNORMAL
GLUCOSE UR QL: NEGATIVE — SIGNIFICANT CHANGE UP
HCG UR QL: NEGATIVE — SIGNIFICANT CHANGE UP
HYALINE CASTS # UR AUTO: 1 /LPF — SIGNIFICANT CHANGE UP (ref 0–7)
KETONES UR-MCNC: NEGATIVE — SIGNIFICANT CHANGE UP
LEUKOCYTE ESTERASE UR-ACNC: ABNORMAL
NITRITE UR-MCNC: NEGATIVE — SIGNIFICANT CHANGE UP
PH UR: 6 — SIGNIFICANT CHANGE UP (ref 5–8)
PROT UR-MCNC: ABNORMAL
RBC CASTS # UR COMP ASSIST: 10 /HPF — HIGH (ref 0–4)
SP GR SPEC: 1.02 — SIGNIFICANT CHANGE UP (ref 1.01–1.02)
UROBILINOGEN FLD QL: NEGATIVE — SIGNIFICANT CHANGE UP
WBC UR QL: >50

## 2023-03-22 PROCEDURE — 87186 SC STD MICRODIL/AGAR DIL: CPT

## 2023-03-22 PROCEDURE — 99285 EMERGENCY DEPT VISIT HI MDM: CPT | Mod: 25

## 2023-03-22 PROCEDURE — 76856 US EXAM PELVIC COMPLETE: CPT | Mod: 26

## 2023-03-22 PROCEDURE — 81001 URINALYSIS AUTO W/SCOPE: CPT

## 2023-03-22 PROCEDURE — 99284 EMERGENCY DEPT VISIT MOD MDM: CPT

## 2023-03-22 PROCEDURE — 87086 URINE CULTURE/COLONY COUNT: CPT

## 2023-03-22 PROCEDURE — 93975 VASCULAR STUDY: CPT

## 2023-03-22 PROCEDURE — 76856 US EXAM PELVIC COMPLETE: CPT

## 2023-03-22 PROCEDURE — 87077 CULTURE AEROBIC IDENTIFY: CPT

## 2023-03-22 PROCEDURE — 81025 URINE PREGNANCY TEST: CPT

## 2023-03-22 PROCEDURE — 93976 VASCULAR STUDY: CPT | Mod: 26,59

## 2023-03-22 RX ORDER — ACETAMINOPHEN 500 MG
975 TABLET ORAL ONCE
Refills: 0 | Status: COMPLETED | OUTPATIENT
Start: 2023-03-22 | End: 2023-03-22

## 2023-03-22 RX ORDER — CEFUROXIME AXETIL 250 MG
1 TABLET ORAL
Qty: 10 | Refills: 0
Start: 2023-03-22 | End: 2023-03-26

## 2023-03-22 RX ADMIN — Medication 975 MILLIGRAM(S): at 12:41

## 2023-03-22 NOTE — ED PROVIDER NOTE - PATIENT PORTAL LINK FT
You can access the FollowMyHealth Patient Portal offered by [facility] by registering at the following website: [email or web address]. By joining [employer]’s FollowMyHealth portal, you will also be able to view your health information using other applications (apps) compatible with our system.

## 2023-03-22 NOTE — ED PROVIDER NOTE - PHYSICAL EXAMINATION
CONSTITUTIONAL: Well appearing and in no apparent distress.  ENT: Airway patent, moist mucous membranes.   EYES: Pupils equal, round and reactive to light. EOMI. Conjunctiva normal appearing.   CARDIAC: Normal rate, regular rhythm.  Heart sounds S1, S2.    RESPIRATORY: Breath sounds clear and equal bilaterally.   GASTROINTESTINAL: Abdomen soft, non-tender, not distended. No rebound/gaurding. No CVAT bilaterally.   MUSCULOSKELETAL: Spine appears normal.  PELVIC: Performed by Dr. Daniels.   NEUROLOGICAL: Alert and oriented x3, no focal deficits, no motor or sensory deficits. 5/5 muscle strength throughout.  SKIN: Skin normal color, warm, dry and intact.   PSYCHIATRIC: Normal mood and affect.

## 2023-03-22 NOTE — ED PROVIDER NOTE - OBJECTIVE STATEMENT
21 yo F with a PMH of miscarriage 4mo (was 2 mo gestation) p/w lower abd cramping, dysuria described as a burning and stabbing sensation as well as urinary frequency x 4 days. Sxs constant and worsening since onset. Has never had before. Denies nausea, vomiting, fever, chills, chest pain, back/flank pain, hematuria, urgency, vaginal bleeding/odor/dc. No hx of STIs. Sexually active with . LMP 02/25

## 2023-03-22 NOTE — ED PROVIDER NOTE - ATTENDING APP SHARED VISIT CONTRIBUTION OF CARE
Attending MD Daniels:   I personally have seen and examined this patient.  Physician assistant note reviewed and agree on plan of care and except where noted.  See below for details.     Seen in [place], interviewed in Mongolian    22F with PMH/PSH including D&C a few months ago presents to the ED with burning on urination, lower abdominal cramping, increased urinary frequency for 4 days.  Reports that she has pain with urinating, increased frequency and urgency.  Denies hematuria.  LMP 2/25/23. Denies vaginal discharge.  Reports is sexually active with one male partner, denies history of STI.  Denies fevers. Reports subjective chills.  Denies nausea, vomiting, diarrhea, bloody or black stools.  Reports tolerating po.  Denies chest pain, shortness of breath, URI symptoms.  Denies previous episode.    Exam:   General: NAD  HENT: head NCAT, airway patent  Eyes: anicteric, no conjunctival injection   Lungs: lungs CTAB with good inspiratory effort, no wheezing, no rhonchi, no rales  Cardiac: +S1S2, no obvious m/r/g  GI: abdomen soft with +BS, NT, ND  : bilateral CVAT, chaperoned by KENIA Nolan, no CMT, bilateral adnexal tenderness  MSK: ranging neck and extremities freely  Neuro: moving all extremities spontaneously, nonfocal  Psych: normal mood and affect     A/P: 22F with dysuria, frequency, CVAT, will evaluate for but not limited to pyelo, will also consider ovarian cysts, history and clinical picture not consistent with torsion, TOA at this time, will obtain UA/UrCx, will give analgesia, will send for US, will Rx antibiotics as needed

## 2023-03-22 NOTE — ED PROVIDER NOTE - NSFOLLOWUPINSTRUCTIONS_ED_ALL_ED_FT
Se descubrió que tenía julian infección del tracto urinario. Por favor, asegúrese de xiao los antibióticos en la farmacia. Tómelo geneva se indica y complete todo el curso. Puede xiao Tylenol y/o Motrin según sea necesario para el dolor. Asegúrate de descansar e hidratarte. Llame al número a continuación para programar julian berny con el médico de atención primaria para el seguimiento. Se descubrió que tenía julian infección del tracto urinario. Por favor, asegúrese de xiao los antibióticos en la farmacia. Tómelo geneva se indica y complete todo el curso. Puede xiao Tylenol y/o Motrin según sea necesario para el dolor. Asegúrate de descansar e hidratarte. Llame al número a continuación para programar julian berny con el médico de atención primaria para el seguimiento.    [facility] at the [facility]  [address], [address], [place] [address]  Phone: [phone]

## 2023-03-24 NOTE — ED POST DISCHARGE NOTE - RESULT SUMMARY
3/24: Prelim ucx >100K E. coli and 50-99K Group B Strep. Pt discharge home on cefuroxime for UTI. Will await final sensitivities to determine need for contact vs appropriate care given. - Ángel Malave PA-C

## 2023-03-29 ENCOUNTER — APPOINTMENT (OUTPATIENT)
Dept: OBGYN | Facility: CLINIC | Age: 23
End: 2023-03-29

## 2023-07-18 ENCOUNTER — TRANSCRIPTION ENCOUNTER (OUTPATIENT)
Age: 23
End: 2023-07-18

## 2023-07-18 ENCOUNTER — INPATIENT (INPATIENT)
Facility: HOSPITAL | Age: 23
LOS: 1 days | Discharge: HOME | DRG: 342 | End: 2023-07-20
Attending: SURGERY | Admitting: SURGERY
Payer: MEDICAID

## 2023-07-18 VITALS
OXYGEN SATURATION: 99 % | RESPIRATION RATE: 18 BRPM | WEIGHT: 134.92 LBS | HEIGHT: 64 IN | SYSTOLIC BLOOD PRESSURE: 105 MMHG | HEART RATE: 64 BPM | DIASTOLIC BLOOD PRESSURE: 73 MMHG | TEMPERATURE: 98 F

## 2023-07-18 DIAGNOSIS — K08.409 PARTIAL LOSS OF TEETH, UNSPECIFIED CAUSE, UNSPECIFIED CLASS: Chronic | ICD-10-CM

## 2023-07-18 LAB
ALBUMIN SERPL ELPH-MCNC: 4.7 G/DL — SIGNIFICANT CHANGE UP (ref 3.3–5)
ALP SERPL-CCNC: 84 U/L — SIGNIFICANT CHANGE UP (ref 40–120)
ALT FLD-CCNC: 14 U/L — SIGNIFICANT CHANGE UP (ref 10–45)
ANION GAP SERPL CALC-SCNC: 10 MMOL/L — SIGNIFICANT CHANGE UP (ref 5–17)
APPEARANCE UR: CLEAR — SIGNIFICANT CHANGE UP
AST SERPL-CCNC: 19 U/L — SIGNIFICANT CHANGE UP (ref 10–40)
BASE EXCESS BLDV CALC-SCNC: -0.7 MMOL/L — SIGNIFICANT CHANGE UP (ref -2–3)
BASOPHILS # BLD AUTO: 0.38 K/UL — HIGH (ref 0–0.2)
BASOPHILS NFR BLD AUTO: 5.3 % — HIGH (ref 0–2)
BILIRUB SERPL-MCNC: 0.3 MG/DL — SIGNIFICANT CHANGE UP (ref 0.2–1.2)
BILIRUB UR-MCNC: NEGATIVE — SIGNIFICANT CHANGE UP
BUN SERPL-MCNC: 10 MG/DL — SIGNIFICANT CHANGE UP (ref 7–23)
CA-I SERPL-SCNC: 1.25 MMOL/L — SIGNIFICANT CHANGE UP (ref 1.15–1.33)
CALCIUM SERPL-MCNC: 9.6 MG/DL — SIGNIFICANT CHANGE UP (ref 8.4–10.5)
CHLORIDE BLDV-SCNC: 105 MMOL/L — SIGNIFICANT CHANGE UP (ref 96–108)
CHLORIDE SERPL-SCNC: 106 MMOL/L — SIGNIFICANT CHANGE UP (ref 96–108)
CO2 BLDV-SCNC: 27 MMOL/L — HIGH (ref 22–26)
CO2 SERPL-SCNC: 23 MMOL/L — SIGNIFICANT CHANGE UP (ref 22–31)
COLOR SPEC: YELLOW — SIGNIFICANT CHANGE UP
CREAT SERPL-MCNC: 0.56 MG/DL — SIGNIFICANT CHANGE UP (ref 0.5–1.3)
DIFF PNL FLD: NEGATIVE — SIGNIFICANT CHANGE UP
EGFR: 132 ML/MIN/1.73M2 — SIGNIFICANT CHANGE UP
EOSINOPHIL # BLD AUTO: 0.26 K/UL — SIGNIFICANT CHANGE UP (ref 0–0.5)
EOSINOPHIL NFR BLD AUTO: 3.6 % — SIGNIFICANT CHANGE UP (ref 0–6)
GAS PNL BLDV: 136 MMOL/L — SIGNIFICANT CHANGE UP (ref 136–145)
GAS PNL BLDV: SIGNIFICANT CHANGE UP
GAS PNL BLDV: SIGNIFICANT CHANGE UP
GLUCOSE BLDV-MCNC: 78 MG/DL — SIGNIFICANT CHANGE UP (ref 70–99)
GLUCOSE SERPL-MCNC: 80 MG/DL — SIGNIFICANT CHANGE UP (ref 70–99)
GLUCOSE UR QL: NEGATIVE — SIGNIFICANT CHANGE UP
HCG SERPL-ACNC: <2 MIU/ML — SIGNIFICANT CHANGE UP
HCO3 BLDV-SCNC: 26 MMOL/L — SIGNIFICANT CHANGE UP (ref 22–29)
HCT VFR BLD CALC: 37.8 % — SIGNIFICANT CHANGE UP (ref 34.5–45)
HCT VFR BLDA CALC: 39 % — SIGNIFICANT CHANGE UP (ref 34.5–46.5)
HGB BLD CALC-MCNC: 12.9 G/DL — SIGNIFICANT CHANGE UP (ref 11.7–16.1)
HGB BLD-MCNC: 12.2 G/DL — SIGNIFICANT CHANGE UP (ref 11.5–15.5)
KETONES UR-MCNC: NEGATIVE — SIGNIFICANT CHANGE UP
LACTATE BLDV-MCNC: 1.6 MMOL/L — SIGNIFICANT CHANGE UP (ref 0.5–2)
LEUKOCYTE ESTERASE UR-ACNC: NEGATIVE — SIGNIFICANT CHANGE UP
LIDOCAIN IGE QN: 26 U/L — SIGNIFICANT CHANGE UP (ref 7–60)
LYMPHOCYTES # BLD AUTO: 2.83 K/UL — SIGNIFICANT CHANGE UP (ref 1–3.3)
LYMPHOCYTES # BLD AUTO: 39.3 % — SIGNIFICANT CHANGE UP (ref 13–44)
MANUAL SMEAR VERIFICATION: SIGNIFICANT CHANGE UP
MCHC RBC-ENTMCNC: 27.9 PG — SIGNIFICANT CHANGE UP (ref 27–34)
MCHC RBC-ENTMCNC: 32.3 GM/DL — SIGNIFICANT CHANGE UP (ref 32–36)
MCV RBC AUTO: 86.5 FL — SIGNIFICANT CHANGE UP (ref 80–100)
MONOCYTES # BLD AUTO: 0.39 K/UL — SIGNIFICANT CHANGE UP (ref 0–0.9)
MONOCYTES NFR BLD AUTO: 5.4 % — SIGNIFICANT CHANGE UP (ref 2–14)
NEUTROPHILS # BLD AUTO: 3.35 K/UL — SIGNIFICANT CHANGE UP (ref 1.8–7.4)
NEUTROPHILS NFR BLD AUTO: 45.5 % — SIGNIFICANT CHANGE UP (ref 43–77)
NEUTS BAND # BLD: 0.9 % — SIGNIFICANT CHANGE UP (ref 0–8)
NITRITE UR-MCNC: NEGATIVE — SIGNIFICANT CHANGE UP
PCO2 BLDV: 49 MMHG — HIGH (ref 39–42)
PH BLDV: 7.33 — SIGNIFICANT CHANGE UP (ref 7.32–7.43)
PH UR: 6 — SIGNIFICANT CHANGE UP (ref 5–8)
PLAT MORPH BLD: NORMAL — SIGNIFICANT CHANGE UP
PLATELET # BLD AUTO: 317 K/UL — SIGNIFICANT CHANGE UP (ref 150–400)
PO2 BLDV: 21 MMHG — LOW (ref 25–45)
POTASSIUM BLDV-SCNC: 3.9 MMOL/L — SIGNIFICANT CHANGE UP (ref 3.5–5.1)
POTASSIUM SERPL-MCNC: 4 MMOL/L — SIGNIFICANT CHANGE UP (ref 3.5–5.3)
POTASSIUM SERPL-SCNC: 4 MMOL/L — SIGNIFICANT CHANGE UP (ref 3.5–5.3)
PROT SERPL-MCNC: 7.4 G/DL — SIGNIFICANT CHANGE UP (ref 6–8.3)
PROT UR-MCNC: SIGNIFICANT CHANGE UP
RBC # BLD: 4.37 M/UL — SIGNIFICANT CHANGE UP (ref 3.8–5.2)
RBC # FLD: 12.7 % — SIGNIFICANT CHANGE UP (ref 10.3–14.5)
RBC BLD AUTO: SIGNIFICANT CHANGE UP
SAO2 % BLDV: 25.4 % — LOW (ref 67–88)
SODIUM SERPL-SCNC: 139 MMOL/L — SIGNIFICANT CHANGE UP (ref 135–145)
SP GR SPEC: 1.03 — HIGH (ref 1.01–1.02)
UROBILINOGEN FLD QL: NEGATIVE — SIGNIFICANT CHANGE UP
WBC # BLD: 7.21 K/UL — SIGNIFICANT CHANGE UP (ref 3.8–10.5)
WBC # FLD AUTO: 7.21 K/UL — SIGNIFICANT CHANGE UP (ref 3.8–10.5)

## 2023-07-18 PROCEDURE — 74177 CT ABD & PELVIS W/CONTRAST: CPT | Mod: 26,MA

## 2023-07-18 PROCEDURE — 76830 TRANSVAGINAL US NON-OB: CPT | Mod: 26

## 2023-07-18 PROCEDURE — 93975 VASCULAR STUDY: CPT | Mod: 26

## 2023-07-18 PROCEDURE — 99285 EMERGENCY DEPT VISIT HI MDM: CPT

## 2023-07-18 RX ORDER — ACETAMINOPHEN 500 MG
1000 TABLET ORAL ONCE
Refills: 0 | Status: COMPLETED | OUTPATIENT
Start: 2023-07-18 | End: 2023-07-18

## 2023-07-18 RX ADMIN — Medication 400 MILLIGRAM(S): at 22:24

## 2023-07-18 NOTE — ED ADULT NURSE NOTE - NSFALLUNIVINTERV_ED_ALL_ED
Bed/Stretcher in lowest position, wheels locked, appropriate side rails in place/Call bell, personal items and telephone in reach/Instruct patient to call for assistance before getting out of bed/chair/stretcher/Non-slip footwear applied when patient is off stretcher/[place] to call system/Physically safe environment - no spills, clutter or unnecessary equipment/Purposeful proactive rounding/Room/bathroom lighting operational, light cord in reach

## 2023-07-18 NOTE — ED PROVIDER NOTE - RAPID ASSESSMENT
MD Tate:  patient seen in triage/quick-look encounter to expedite medical workup.  H&P is NOT complete, and will defer final HPI, testing, workup, and MDM to treating team in main ED.  H&P obtained through the help of  [id number]  23 yo F, c/o 5d abdominal pain.    Associated Sx:  +nausea.    no VB; home pregnancy test this AM was negative.   No dysuria, no vaginal bleeding.   Surg Hx: D&C 8mos ago for IUFD @ 2 mos EGA  VS: wnl.  Gen:  Well appearing in NAD.  Head:  NC/AT.  Resp: No distress.  Ext: no deformities.  Skin: warm and dry as visualized.  Neuro: alert and oriented to person, place, time.  Abdomen: Soft, nondistended, + suprapubic tenderness to palpation  MDM:  DDx includes ovarian cyst, acute appendicitis, cystitis, renal colic.  HCG result will determine the remainder of her ED workup in the main ED.

## 2023-07-18 NOTE — ED PROVIDER NOTE - OBJECTIVE STATEMENT
Patient is a 22-year-old female with no significant past medical history presenting for abdominal pain.  Patient states for the past 5 days she has had intermittent right lower quadrant abdominal pain with some additional abdominal pain in the left lower quadrant associated with some recent bloody stools with red blood mixed into her stool.  Has not had similar pain, pain does not radiate and is not associated with meals or positioning.  Associated with nausea.  Denies dysuria, vaginal discharge or pain, vomiting, chest pain, shortness of breath, dizziness.  Last menstrual period was 2 weeks ago, was a little bit more pain than usual but bleeding normal.  Sexually active with 1 partner, no history of STI, feels safe at home, no alcohol or drug use, no tobacco use.

## 2023-07-18 NOTE — ED PROVIDER NOTE - PHYSICAL EXAMINATION
CONSTITUTIONAL: awake; in no acute distress.   SKIN: warm, dry  HEAD: Normocephalic; atraumatic.  EYES: no conjunctival injection. no scleral icterus  ENT: No nasal discharge; airway clear.  NECK: Supple; non tender.  CARD: S1, S2 normal; no murmurs, gallops, or rubs. Regular rate and rhythm.   RESP: No wheezes, rales or rhonchi. Good air movement bilaterally.   ABD: soft +RLQ>LLQ tenderness without rebound, no guarding, no distention, no rigidity.   : chaperone RN Ashley, R adnexal tender. No cmt. os closed. Vag vault without blood or purulent discharge. External exam wnl.  EXT: Ambulates independently.  No cyanosis or edema.   NEURO: Alert, oriented, grossly unremarkable  PSYCH: Cooperative, appropriate.

## 2023-07-18 NOTE — ED ADULT NURSE NOTE - OBJECTIVE STATEMENT
21 yo presents to the ED from home. A&Ox4, ambulatory c/o abd pain. pt reports 5 days of abd pain. RLQ worse upon palpation. LMP 2 weeks ago. D&C 8 months ago. denies any nausea, vomiting, diarrhea, fever, chills, urinary symptoms. did not take anything for pain control at home. reports pain is intermittent. reports blood in stool noted, bright red blood. translation Jamaican used [id number]. boyfriend at bedside. 20G RAC by QDOC RN. Patient undressed and placed into gown, call bell in hand and side rails up for safety. warm blanket provided, vital signs stable, pt in no acute distress.

## 2023-07-18 NOTE — ED PROVIDER NOTE - ATTENDING CONTRIBUTION TO CARE
23 yo F, c/o 5d abdominal pain with nausea, r adnexal tend on pelvic, no sti with one partner, us ordered, ct ordered in triage, labs, ua nl pending imaging s/p dc 6-7 months ago.

## 2023-07-19 ENCOUNTER — RESULT REVIEW (OUTPATIENT)
Age: 23
End: 2023-07-19

## 2023-07-19 ENCOUNTER — TRANSCRIPTION ENCOUNTER (OUTPATIENT)
Age: 23
End: 2023-07-19

## 2023-07-19 DIAGNOSIS — K35.80 UNSPECIFIED ACUTE APPENDICITIS: ICD-10-CM

## 2023-07-19 LAB
APTT BLD: 29.9 SEC — SIGNIFICANT CHANGE UP (ref 27.5–35.5)
INR BLD: 1.22 RATIO — HIGH (ref 0.88–1.16)
PROTHROM AB SERPL-ACNC: 14.1 SEC — HIGH (ref 10.5–13.4)

## 2023-07-19 PROCEDURE — 88304 TISSUE EXAM BY PATHOLOGIST: CPT | Mod: 26

## 2023-07-19 PROCEDURE — 99232 SBSQ HOSP IP/OBS MODERATE 35: CPT | Mod: GC

## 2023-07-19 DEVICE — STAPLER COVIDIEN TRI-STAPLE 45MM PURPLE RELOAD: Type: IMPLANTABLE DEVICE | Status: FUNCTIONAL

## 2023-07-19 RX ORDER — KETOROLAC TROMETHAMINE 30 MG/ML
15 SYRINGE (ML) INJECTION EVERY 6 HOURS
Refills: 0 | Status: DISCONTINUED | OUTPATIENT
Start: 2023-07-19 | End: 2023-07-20

## 2023-07-19 RX ORDER — SODIUM CHLORIDE 9 MG/ML
500 INJECTION, SOLUTION INTRAVENOUS ONCE
Refills: 0 | Status: COMPLETED | OUTPATIENT
Start: 2023-07-19 | End: 2023-07-19

## 2023-07-19 RX ORDER — KETOROLAC TROMETHAMINE 30 MG/ML
30 SYRINGE (ML) INJECTION ONCE
Refills: 0 | Status: DISCONTINUED | OUTPATIENT
Start: 2023-07-19 | End: 2023-07-19

## 2023-07-19 RX ORDER — ONDANSETRON 8 MG/1
4 TABLET, FILM COATED ORAL EVERY 6 HOURS
Refills: 0 | Status: DISCONTINUED | OUTPATIENT
Start: 2023-07-19 | End: 2023-07-20

## 2023-07-19 RX ORDER — SODIUM CHLORIDE 9 MG/ML
500 INJECTION, SOLUTION INTRAVENOUS ONCE
Refills: 0 | Status: COMPLETED | OUTPATIENT
Start: 2023-07-19 | End: 2023-07-20

## 2023-07-19 RX ORDER — ACETAMINOPHEN 500 MG
1000 TABLET ORAL ONCE
Refills: 0 | Status: COMPLETED | OUTPATIENT
Start: 2023-07-19 | End: 2023-07-19

## 2023-07-19 RX ORDER — HYDROMORPHONE HYDROCHLORIDE 2 MG/ML
1 INJECTION INTRAMUSCULAR; INTRAVENOUS; SUBCUTANEOUS
Refills: 0 | Status: DISCONTINUED | OUTPATIENT
Start: 2023-07-19 | End: 2023-07-19

## 2023-07-19 RX ORDER — ERTAPENEM SODIUM 1 G/1
1000 INJECTION, POWDER, LYOPHILIZED, FOR SOLUTION INTRAMUSCULAR; INTRAVENOUS ONCE
Refills: 0 | Status: COMPLETED | OUTPATIENT
Start: 2023-07-19 | End: 2023-07-19

## 2023-07-19 RX ORDER — SODIUM CHLORIDE 9 MG/ML
1000 INJECTION, SOLUTION INTRAVENOUS
Refills: 0 | Status: DISCONTINUED | OUTPATIENT
Start: 2023-07-19 | End: 2023-07-19

## 2023-07-19 RX ORDER — ACETAMINOPHEN 500 MG
1000 TABLET ORAL EVERY 6 HOURS
Refills: 0 | Status: DISCONTINUED | OUTPATIENT
Start: 2023-07-19 | End: 2023-07-19

## 2023-07-19 RX ORDER — OXYCODONE HYDROCHLORIDE 5 MG/1
5 TABLET ORAL EVERY 6 HOURS
Refills: 0 | Status: DISCONTINUED | OUTPATIENT
Start: 2023-07-19 | End: 2023-07-20

## 2023-07-19 RX ORDER — ENOXAPARIN SODIUM 100 MG/ML
40 INJECTION SUBCUTANEOUS EVERY 24 HOURS
Refills: 0 | Status: DISCONTINUED | OUTPATIENT
Start: 2023-07-20 | End: 2023-07-20

## 2023-07-19 RX ORDER — ONDANSETRON 8 MG/1
4 TABLET, FILM COATED ORAL ONCE
Refills: 0 | Status: COMPLETED | OUTPATIENT
Start: 2023-07-19 | End: 2023-07-19

## 2023-07-19 RX ORDER — ONDANSETRON 8 MG/1
4 TABLET, FILM COATED ORAL ONCE
Refills: 0 | Status: DISCONTINUED | OUTPATIENT
Start: 2023-07-19 | End: 2023-07-19

## 2023-07-19 RX ORDER — HEPARIN SODIUM 5000 [USP'U]/ML
5000 INJECTION INTRAVENOUS; SUBCUTANEOUS EVERY 12 HOURS
Refills: 0 | Status: DISCONTINUED | OUTPATIENT
Start: 2023-07-19 | End: 2023-07-19

## 2023-07-19 RX ORDER — HYDROMORPHONE HYDROCHLORIDE 2 MG/ML
0.5 INJECTION INTRAMUSCULAR; INTRAVENOUS; SUBCUTANEOUS
Refills: 0 | Status: DISCONTINUED | OUTPATIENT
Start: 2023-07-19 | End: 2023-07-19

## 2023-07-19 RX ORDER — ACETAMINOPHEN 500 MG
975 TABLET ORAL EVERY 6 HOURS
Refills: 0 | Status: DISCONTINUED | OUTPATIENT
Start: 2023-07-19 | End: 2023-07-20

## 2023-07-19 RX ADMIN — OXYCODONE HYDROCHLORIDE 5 MILLIGRAM(S): 5 TABLET ORAL at 21:13

## 2023-07-19 RX ADMIN — ONDANSETRON 4 MILLIGRAM(S): 8 TABLET, FILM COATED ORAL at 08:15

## 2023-07-19 RX ADMIN — Medication 1000 MILLIGRAM(S): at 07:45

## 2023-07-19 RX ADMIN — ERTAPENEM SODIUM 120 MILLIGRAM(S): 1 INJECTION, POWDER, LYOPHILIZED, FOR SOLUTION INTRAMUSCULAR; INTRAVENOUS at 03:17

## 2023-07-19 RX ADMIN — Medication 15 MILLIGRAM(S): at 20:25

## 2023-07-19 RX ADMIN — HYDROMORPHONE HYDROCHLORIDE 1 MILLIGRAM(S): 2 INJECTION INTRAMUSCULAR; INTRAVENOUS; SUBCUTANEOUS at 07:15

## 2023-07-19 RX ADMIN — SODIUM CHLORIDE 500 MILLILITER(S): 9 INJECTION, SOLUTION INTRAVENOUS at 21:22

## 2023-07-19 RX ADMIN — HYDROMORPHONE HYDROCHLORIDE 1 MILLIGRAM(S): 2 INJECTION INTRAMUSCULAR; INTRAVENOUS; SUBCUTANEOUS at 07:30

## 2023-07-19 RX ADMIN — Medication 15 MILLIGRAM(S): at 10:15

## 2023-07-19 RX ADMIN — Medication 15 MILLIGRAM(S): at 19:53

## 2023-07-19 RX ADMIN — Medication 15 MILLIGRAM(S): at 09:49

## 2023-07-19 RX ADMIN — Medication 975 MILLIGRAM(S): at 18:50

## 2023-07-19 RX ADMIN — Medication 975 MILLIGRAM(S): at 23:09

## 2023-07-19 RX ADMIN — Medication 400 MILLIGRAM(S): at 07:24

## 2023-07-19 RX ADMIN — OXYCODONE HYDROCHLORIDE 5 MILLIGRAM(S): 5 TABLET ORAL at 22:13

## 2023-07-19 RX ADMIN — Medication 975 MILLIGRAM(S): at 18:47

## 2023-07-19 RX ADMIN — Medication 1000 MILLIGRAM(S): at 02:08

## 2023-07-19 NOTE — H&P ADULT - ASSESSMENT
23yo F w/ a PSHx of D&C (8 months ago), presents to the ED w/ abdominal pain and nausea for 5 days, w/ a CT concerning for early appendicitis    Plan:  - OR for laparoscopic Appendectomy  - NPO/IVF  - IV abx (ertapenem)  - pain control  - antiemetics  - DVT ppx  - Admit to Dr. Pierson    Discussed w/ ACS attending, Dr. Dangelo Rain, PGY2  ACS, [id number]

## 2023-07-19 NOTE — BRIEF OPERATIVE NOTE - OPERATION/FINDINGS
acute non perforated appendicitis. The mesentery of the appendix was divided using a ligasure. The appendix base was identified, and transected using an EndoGIA 45mm purple. The appendix was placed in an endocatch. The abdomen was irrigated and suctioned. Hemostasis achieved. The staple line appear hemostatic.

## 2023-07-19 NOTE — ED ADULT NURSE REASSESSMENT NOTE - NSFALLCONCLUSION_ED_ALL_ED
Universal Safety Interventions
Introduction Text (Please End With A Colon): The following procedure was deferred:
Procedure To Be Performed At Next Visit: Cryotherapy
Detail Level: Zone

## 2023-07-19 NOTE — DISCHARGE NOTE PROVIDER - PROVIDER TOKENS
PROVIDER:[TOKEN:[[id number]],FOLLOWUP:[2 weeks]] PROVIDER:[TOKEN:[[id number]],FOLLOWUP:[2 weeks]],PROVIDER:[TOKEN:[[id number]],FOLLOWUP:[2 weeks]]

## 2023-07-19 NOTE — H&P ADULT - HISTORY OF PRESENT ILLNESS
21yo F w/o significant PMHx and a PSHx of a D&C 8 months ago (for missed ), presents to the ED w/ lower abdominal pain and nausea for 5 days. The patient reports the pain has been mild for the first few days but worsened yesterday, which brought her in. She denies the  abdominal pain is associated with food or bowel movements, Reports the nausea is associated w/ food and did not result in emesis. Denies fever/chills, night sweats, SOB, chest pain, dizziness, weakness, or diarrhea. Reports her last bowel movement was yesterday morning and she needed to strain to have a BM, nonbloody. Patient last menstrual period was 2 weeks ago. Denies history of RLQ abdominal pain.    Of note, the patient has multiple episodes on nonpainful, bloody BMs 3 weeks ago for a 2 week duration. Denies any history of colonoscopy or abdominal surgery.    Patient was seen and examined bedside in the ED. The patient is hemodynamically stable and nontoxic appearing. Denies abdominal pain at the moment, reports mild nausea. Focused physical exam is notable for mild suprapubic tenderness. CT w/ IV contrast reveals a nonenlarged appendix w/ enhancement of the mucosal lining w/ fat stranding around the appendix.

## 2023-07-19 NOTE — DISCHARGE NOTE PROVIDER - CARE PROVIDER_API CALL
Cody Pierson [facility]  [address] [place] [address], [facility] [address]  [place] [address]  Phone: [phone]  Fax: [phone]  Follow Up Time: 2 weeks   Cody Pierson [facility]  [address] [facility], [address]  [place] [address]  Phone: [phone]  Fax: [phone]  Follow Up Time: 2 weeks    Roney Lewis  Gastroenterology  [address] [facility] [address] [facility] [facility], [address]  [place] [address]  Phone: [phone]  Fax: [phone]  Follow Up Time: 2 weeks

## 2023-07-19 NOTE — DISCHARGE NOTE PROVIDER - HOSPITAL COURSE
23yo F w/o significant PMHx and a PSHx of a D&C 8 months ago (for missed ), presents to the ED w/ lower abdominal pain and nausea for 5 days. The patient reports the pain has been mild for the first few days but worsened yesterday, which brought her in. She denies the  abdominal pain is associated with food or bowel movements, Reports the nausea is associated w/ food and did not result in emesis. Denies fever/chills, night sweats, SOB, chest pain, dizziness, weakness, or diarrhea. Reports her last bowel movement was yesterday morning and she needed to strain to have a BM, nonbloody. Patient last menstrual period was 2 weeks ago. Denies history of RLQ abdominal pain.  Of note, the patient has multiple episodes on nonpainful, bloody BMs 3 weeks ago for a 2 week duration. Denies any history of colonoscopy or abdominal surgery.  Patient was seen and examined bedside in the ED. The patient is hemodynamically stable and nontoxic appearing. Denies abdominal pain at the moment, reports mild nausea. Focused physical exam is notable for mild suprapubic tenderness. CT w/ IV contrast reveals a nonenlarged appendix w/ enhancement of the mucosal lining w/ fat stranding around the appendix.  On  pt underwent laparoscopic appendectomy. The patient tolerated the procedure well, was extubated and sent to PACU in stable condition. The patient was hemodynamically stable and was transferred to a surgical floor. The patient's pain was controlled by IV pain medications and then by PO pain medications. The patient was advanced from clears to regular diet and tolerated it well.  The patient is ambulating, voiding, tolerating a regular diet, and pain is controlled with oral pain medications.  Patient is stable for discharge home and will follow-up with Dr. Pierson or one of his partners within 1-2 weeks. 23yo F w/o significant PMHx and a PSHx of a D&C 8 months ago (for missed ), presents to the ED w/ lower abdominal pain and nausea for 5 days. The patient reports the pain has been mild for the first few days but worsened yesterday, which brought her in. She denies the  abdominal pain is associated with food or bowel movements, Reports the nausea is associated w/ food and did not result in emesis. Denies fever/chills, night sweats, SOB, chest pain, dizziness, weakness, or diarrhea. Reports her last bowel movement was yesterday morning and she needed to strain to have a BM, nonbloody. Patient last menstrual period was 2 weeks ago. Denies history of RLQ abdominal pain.  Of note, the patient has multiple episodes on nonpainful, bloody BMs 3 weeks ago for a 2 week duration. Denies any history of colonoscopy or abdominal surgery.  Patient was seen and examined bedside in the ED. The patient is hemodynamically stable and nontoxic appearing. Denies abdominal pain at the moment, reports mild nausea. Focused physical exam is notable for mild suprapubic tenderness. CT w/ IV contrast reveals a nonenlarged appendix w/ enhancement of the mucosal lining w/ fat stranding around the appendix.  On  pt underwent laparoscopic appendectomy. The patient tolerated the procedure well, was extubated and sent to PACU in stable condition. The patient was hemodynamically stable and was transferred to a surgical floor. The patient's pain was controlled by IV pain medications and then by PO pain medications. The patient was advanced from clears to regular diet and tolerated it well.  The patient is ambulating, voiding, tolerating a regular diet, and pain is controlled with oral pain medications.  She was evaluated by GI who recommended bowel regimen and outpatient follow-up.  Patient is stable for discharge home and will follow-up with Dr. Pierson or one of his partners within 1-2 weeks.

## 2023-07-19 NOTE — PATIENT PROFILE ADULT - FALL HARM RISK - UNIVERSAL INTERVENTIONS
Bed in lowest position, wheels locked, appropriate side rails in place/Call bell, personal items and telephone in reach/Instruct patient to call for assistance before getting out of bed or chair/Non-slip footwear when patient is out of bed/[place] to call system/Physically safe environment - no spills, clutter or unnecessary equipment/Purposeful Proactive Rounding/Room/bathroom lighting operational, light cord in reach

## 2023-07-19 NOTE — H&P ADULT - NSHPLABSRESULTS_GEN_ALL_CORE
Vital Signs Last 24 Hrs  T(C): 36.8 (19 Jul 2023 02:08), Max: 36.9 (18 Jul 2023 13:23)  T(F): 98.2 (19 Jul 2023 02:08), Max: 98.5 (18 Jul 2023 13:23)  HR: 54 (19 Jul 2023 02:08) (50 - 64)  BP: 100/61 (19 Jul 2023 02:08) (94/60 - 109/64)  BP(mean): 73 (19 Jul 2023 02:08) (73 - 76)  RR: 16 (19 Jul 2023 02:08) (14 - 18)  SpO2: 100% (19 Jul 2023 02:08) (98% - 100%)    Parameters below as of 19 Jul 2023 02:08  Patient On (Oxygen Delivery Method): room air    CBC (07-18 @ 17:39)                              12.2                           7.21    )----------------(  317        45.5  % Neutrophils, 39.3  % Lymphocytes, ANC: 3.35                                37.8                  BMP (07-18 @ 17:39)             139     |  106     |  10    		Ca++ --      Ca 9.6                ---------------------------------( 80    		Mg --                 4.0     |  23      |  0.56  			Ph --        LFTs (07-18 @ 17:39)      TPro 7.4 / Alb 4.7 / TBili 0.3 / DBili -- / AST 19 / ALT 14 / AlkPhos 84      ABG (07-18 @ 17:39)      /  /  /  /  / %     Lactate:   1.6    VBG (07-18 @ 17:39)     7.33 / 49<H> / 21<L> / 26 / -0.7 / 25.4<L>%      IMAGING:  < from: CT Abdomen and Pelvis w/ IV Cont (07.18.23 @ 21:23) >    FINDINGS:  LOWER CHEST: Within normal limits.    LIVER: Within normal limits.  BILE DUCTS: Normal caliber.  GALLBLADDER: Within normal limits.  SPLEEN: Within normal limits.  PANCREAS: Within normal limits.  ADRENALS: Within normal limits.  KIDNEYS/URETERS: Bilateral subcentimeter hypodensities too small to   characterize. The kidneys enhance symmetrically. No hydronephrosis.    BLADDER: Underdistended.  REPRODUCTIVE ORGANS: Retroverted and retroflexed uterus.. Left corpus   luteal cyst.    BOWEL: No bowel obstruction. Appendix is not enlarged but there is a   segmental mucosal hyperenhancement (5-27, 3-78). There is minimal   periappendiceal fat stranding.  PERITONEUM: Small pelvic fluid in the cul-de-sac.  VESSELS: Within normal limits.  RETROPERITONEUM/LYMPH NODES: No lymphadenopathy.  ABDOMINAL WALL: Within normal limits.  BONES: Within normal limits.    IMPRESSION:  Concerning for early appendicitis.      < end of copied text >

## 2023-07-19 NOTE — CONSULT NOTE ADULT - ATTENDING COMMENTS
22F admitted w appendicits, s/p appendectomy earlier today (7/19/2023).   GI consulted for constipation and intermittent hematochezia.   Hgb 12.2     Hematochezia ikely due to hemorrhoids but differential also includes polyp, mass, IBD, other intraluminal lesion  Defer inpatient cscope now as patient is admitted w appendicitis and had an appendectomy earlier today   She will need outpatient follow up for evaluation and colonoscopy once he recovers   Miralax bowel regimen   monitor bowel movements  trend h/h     GI to sign off, please call w questions

## 2023-07-19 NOTE — CONSULT NOTE ADULT - ASSESSMENT
21yo F w/o significant PMHx and a PSHx of a D&C 8 months ago (for missed ) admitted with acute appendicitis s/p lap appendectomy and course notable for 2-4 weeks hx of hematochezia for which GI is consulted    Impression  #hematochezia without hemodynamic instability  - associated with burning rectal pain and 2 month hx of constipation  - rectal exam with brown stools; no hemorrhoid; no obvious anal fissure  DDx: anal fissure vs. hemorrhoidal bleed vs. diverticular vs. unlikely malignancy or AVM 21yo F w/o significant PMHx and a PSHx of a D&C 8 months ago (for missed ) admitted with acute appendicitis s/p lap appendectomy and course notable for 2-4 weeks hx of hematochezia for which GI is consulted    Impression  #hematochezia without hemodynamic instability  DDx: anal fissure vs. hemorrhoidal bleed vs. diverticular vs. unlikely malignancy or AVM  - associated with burning rectal pain and 2 month hx of constipation  - rectal exam with brown stools; no hemorrhoid; no obvious anal fissure  - vitals signs stable; Hgb 12.2  #acute appendicitis s/p lap appendectomy    Recommendations:  ***incomplete; to be staffed***  - no plans for inpatient colonoscopy  - bowel regimen; ensure pt is having soft BM  - trend Hgb    Benny Urrutia MD  GI/Hepatology Fellow, PGY4  Teams preferred (7AM to 5PM); after 5PM, call GI fellow on call    NEW CONSULTS:  Please email [email or web address] 23yo F w/o significant PMHx and a PSHx of a D&C 8 months ago (for missed ) admitted with acute appendicitis s/p lap appendectomy and course notable for 2-4 weeks hx of hematochezia for which GI is consulted    Impression  #hematochezia without hemodynamic instability  DDx: anal fissure vs. hemorrhoidal bleed vs. diverticular vs. unlikely malignancy or AVM  - associated with burning rectal pain and 2 month hx of constipation  - rectal exam with brown stools; no hemorrhoid; no obvious anal fissure  - no FHx of colonic malignancy  - vitals signs stable; Hgb 12.2  #acute appendicitis s/p lap appendectomy    Recommendations:  ***incomplete; to be staffed***  - no plans for inpatient colonoscopy  - bowel regimen; ensure pt is having soft BM  - trend Hgb    Benny Urrutia MD  GI/Hepatology Fellow, PGY4  Teams preferred (7AM to 5PM); after 5PM, call GI fellow on call    NEW CONSULTS:  Please email [email or web address] 21yo F w/o significant PMHx and a PSHx of a D&C 8 months ago (for missed ) admitted with acute appendicitis s/p lap appendectomy and course notable for 2-4 weeks hx of hematochezia for which GI is consulted    Impression  #hematochezia without hemodynamic instability  DDx: anal fissure vs. hemorrhoidal bleed vs. diverticular vs. unlikely malignancy or AVM  - associated with burning rectal pain and 2 month hx of constipation  - rectal exam with brown stools; no hemorrhoid; no obvious anal fissure  - no FHx of colonic malignancy  - vitals signs stable; Hgb 12.2  #acute appendicitis s/p lap appendectomy    Recommendations:  - no plans for inpatient colonoscopy; follow up outpatient for colonoscopy; very high risk for colonoscopy at this time given POD 1 from lap appendectomy  - bowel regimen; ensure pt is having soft BM  - trend Hgb    Benny Urrutia MD  GI/Hepatology Fellow, PGY4  Teams preferred (7AM to 5PM); after 5PM, call GI fellow on call    NEW CONSULTS:  Please email [email or web address] 21yo F w/o significant PMHx and a PSHx of a D&C 8 months ago (for missed ) admitted with acute appendicitis s/p lap appendectomy and course notable for 2-4 weeks hx of hematochezia for which GI is consulted    Impression  #hematochezia without hemodynamic instability  DDx: anal fissure vs. hemorrhoidal bleed vs. diverticular vs. unlikely malignancy or AVM  - associated with burning rectal pain and 2 month hx of constipation  - rectal exam with brown stools; no hemorrhoid; no obvious anal fissure  - no FHx of colonic malignancy  - vitals signs stable; Hgb 12.2  #acute appendicitis s/p lap appendectomy     Recommendations:  - no plans for inpatient colonoscopy; follow up outpatient for colonoscopy; very high risk for colonoscopy at this time given pt s/p lap appendectomy earlier today  - bowel regimen; ensure pt is having soft BM  - trend Hgb    Benny Urrutia MD  GI/Hepatology Fellow, PGY4  Teams preferred (7AM to 5PM); after 5PM, call GI fellow on call    NEW CONSULTS:  Please email [email or web address]

## 2023-07-19 NOTE — CHART NOTE - NSCHARTNOTEFT_GEN_A_CORE
Post Operative Note  Patient: REYES, DAYSI 22y (2000) Female   MRN: [id number]  Location: [facility] 2MON 225 D1  Visit: 07-19-23 Inpatient  Date: 07-19-23 @ 12:10    Procedure: S/P Lap appendectomy      ID: [id number]  Subjective:   Patient seen and examined at bedside. Patient states she has tolerated a diet. She endorses lightheadedness but states it has been improving since she got out of her operation. She denies nausea, vomiting, chest pain, SOB.    Provider contacted prior to POC at 11:30 regarding patient's BP was 82/50 and HR was 55 at 10:30. BP cuff was cycled at bedside with BP 99/53 HR 53. Patient states that her pressure typically "runs low," but she did not recall what her baseline BP was.     Objective:  Vitals: T(F): 97.9 (07-19-23 @ 10:30), Max: 98.5 (07-18-23 @ 13:23)  HR: 55 (07-19-23 @ 10:30)  BP: 82/50 (07-19-23 @ 10:30) (82/50 - 137/74)  RR: 18 (07-19-23 @ 10:30)  SpO2: 95% (07-19-23 @ 10:30)  Vent Settings:     In:   07-19-23 @ 07:01  -  07-19-23 @ 12:10  --------------------------------------------------------  IN: 240 mL      IV Fluids:     Out:   07-19-23 @ 07:01  -  07-19-23 @ 12:10  --------------------------------------------------------  OUT: 0 mL      EBL:     Voided Urine:   07-19-23 @ 07:01  -  07-19-23 @ 12:10  --------------------------------------------------------  OUT: 0 mL      Roman Catheter: yes no   Drains:   PAMELA:    ,   Chest Tube:      NG Tube:         Physical Examination:  General: NAD, resting comfortably in bed  Respiratory: Nonlabored respirations  Cardio: pulse present   Abdomen: soft, nondistended, appropriately tender, dressing strikethrough   Vascular: extremities are warm and well perfused.     Imaging:  No post-op imaging studies    Assessment:  22yFemale patient S/P laparoscopic appendectomy     Plan:  - Pain control PRN  - Diet: regular   - Activity: as tolerated   - monitor vs   - DVT ppx    Date/Time: 07-19-23 @ 12:10    ACS/Trauma  [id number]

## 2023-07-19 NOTE — ED ADULT NURSE REASSESSMENT NOTE - NS ED NURSE REASSESS COMMENT FT1
Pt denies any pain or discomfort at the present time, resting comfortably in stretcher. Pt updated on plan of care, to be admitted. Safety and comfort measures in place bed in lowest position, siderails upright, and blanket given.

## 2023-07-19 NOTE — H&P ADULT - NSHPPHYSICALEXAM_GEN_ALL_CORE
GEN: NAD, sleeping comfortably in bed, calm, A&Ox3  Resp: nonlabored respirations  Cardio: NSR  Abdomen: soft, nondistended, mildly tender to palpation over suprapubic area, nontender in the RLQ to deep palpation, no rebound or guarding  Extrem: WWP, FROM  Neuro: no focal deficits

## 2023-07-19 NOTE — DISCHARGE NOTE PROVIDER - NSDCMRMEDTOKEN_GEN_ALL_CORE_FT
cefuroxime 500 mg oral tablet: 1 tab(s) orally 2 times a day    acetaminophen 325 mg oral tablet: 3 tab(s) orally every 6 hours  oxyCODONE 5 mg oral tablet: 1 tab(s) orally every 6 hours as needed for  severe pain MDD: 4

## 2023-07-19 NOTE — DISCHARGE NOTE PROVIDER - NSDCCPCAREPLAN_GEN_ALL_CORE_FT
PRINCIPAL DISCHARGE DIAGNOSIS  Diagnosis: Acute appendicitis  Assessment and Plan of Treatment: WOUND CARE: -Covering your incisions are white pieces of tape called steri-strips. You can shower with these and they will curl up and begin to fall off on their own in about 7 days.   BATHING: Please do not submerge wound underwater. You may shower and/or sponge bathe.  ACTIVITY: No heavy lifting or straining. Otherwise, you may return to your usual level of physical activity. If you are taking narcotic pain medication (such as Percocet), do NOT drive a car, operate machinery or make important decisions.  DIET: Return to your usual diet.  NOTIFY YOUR SURGEON IF: You have any bleeding that does not stop, any pus draining from your wound, any fever (over 100.4 F) or chills, persistent nausea/vomiting, persistent diarrhea, or if your pain is not controlled on your discharge pain medications.  FOLLOW-UP:  1. Follow-up with Dr. Pierson within 1-2 weeks of discharge.  Please call office for appointment  2. Please follow up with your primary care physician in one week regarding your hospitalization.   Please follow up with Dr. Pierson or one of their partners: Dr. Conroy, Dr. Chris, Dr. Saravia, Dr. Hardin, Dr. Beatty, Dr. Garrido, Dr. Avalos,  Dr. Freitas, Dr. Villalobos, or Dr. Sebastian.  Please call [phone] to schedule an appointment in 1-2 weeks

## 2023-07-19 NOTE — ED ADULT NURSE REASSESSMENT NOTE - NSFALLUNIVINTERV_ED_ALL_ED
Bed/Stretcher in lowest position, wheels locked, appropriate side rails in place/Call bell, personal items and telephone in reach/Instruct patient to call for assistance before getting out of bed/chair/stretcher/Non-slip footwear applied when patient is off stretcher/[place] to call system/Physically safe environment - no spills, clutter or unnecessary equipment/Purposeful proactive rounding/Room/bathroom lighting operational, light cord in reach

## 2023-07-19 NOTE — DISCHARGE NOTE PROVIDER - CARE PROVIDERS DIRECT ADDRESSES
[email or web address]

## 2023-07-19 NOTE — PRE-ANESTHESIA EVALUATION ADULT - NSANTHPMHFT_GEN_ALL_CORE
21 y/o female;  abdominal pain/N; D and C for missed AB 8 mos ago  CT abdomen: nonenlarged appendix w/ enhancement of the mucosal lining w/ fat stranding around the appendix.

## 2023-07-19 NOTE — CONSULT NOTE ADULT - SUBJECTIVE AND OBJECTIVE BOX
Chief Complaint:  Patient is a 22y old  Female who presents with a chief complaint of Appendicitis (2023 03:04)    HPI:  21yo F w/o significant PMHx and a PSHx of a D&C 8 months ago (for missed ), presents to the ED w/ lower abdominal pain and nausea for 5 days. The patient reports the pain has been mild for the first few days but worsened yesterday, which brought her in. She denies the  abdominal pain is associated with food or bowel movements, Reports the nausea is associated w/ food and did not result in emesis. Denies fever/chills, night sweats, SOB, chest pain, dizziness, weakness, or diarrhea. Reports her last bowel movement was yesterday morning and she needed to strain to have a BM, nonbloody. Patient last menstrual period was 2 weeks ago. Denies history of RLQ abdominal pain.    Of note, the patient has multiple episodes on nonpainful, bloody BMs 3 weeks ago for a 2 week duration. Denies any history of colonoscopy or abdominal surgery.    Patient was seen and examined bedside in the ED. The patient is hemodynamically stable and nontoxic appearing. Denies abdominal pain at the moment, reports mild nausea. Focused physical exam is notable for mild suprapubic tenderness. CT w/ IV contrast reveals a nonenlarged appendix w/ enhancement of the mucosal lining w/ fat stranding around the appendix. (2023 03:04)    Interval course:  Pt s/p appendectomy for acute appendicitis with minimal intraop blood loss. GI consulted for hematochezia. Pt reports intermittent episodes of hematochezia described as blood in the toilet bowel (without blood on the toilet paper) for the past 2-4 weeks associated with burning rectal pain during BM. Also notes a 2 month hx of daily constipation described as having to strain and passing hard stools; denies any other episodes of abdominal pain during these past 2 months with the exception of the 5day hx of abdominal pain she had prior to presenting with acute appendicitis. No FHx of colonic malignancy; no prior EGD or colonoscopies.    Allergies:  No Known Allergies      Home Medications:    Hospital Medications:  acetaminophen     Tablet .. 975 milliGRAM(s) Oral every 6 hours  ketorolac   Injectable 15 milliGRAM(s) IV Push every 6 hours PRN  ondansetron Injectable 4 milliGRAM(s) IV Push every 6 hours PRN  oxyCODONE    IR 5 milliGRAM(s) Oral every 6 hours PRN      PMHX/PSHX:  No pertinent past medical history    No pertinent past medical history    Missed     No significant past surgical history    [place] teeth extracted    Family history:      Denies family history of colon cancer/polyps, stomach cancer/polyps, pancreatic cancer/masses, liver cancer/disease, ovarian cancer and endometrial cancer.    Social History:     Tob: Denies  EtOH: Denies  Illicit Drugs: Denies    ROS:     General:  No wt loss  CV:  No pain  Pulm: No dyspnea, cough  GI: rectal pain; (+) nausea, No vomiting, No diarrhea, (+) constipation, No weight loss, No fever, No pruritis, No tarry stools, No dysphagia,  Muscle:  No pain, weakness  Neuro:  No weakness, tingling  Psych:  No fatigue, insomnia, mood problems, depression  Heme:  No petechiae, ecchymosis, easy bruisability  Skin:  No rash    PHYSICAL EXAM:   GENERAL:  No acute distress  HEENT:  no scleral icterus  CHEST: speaking in full sentences without distress  HEART:  Regular rate and rhythm  ABDOMEN:  Soft, RLQ tenderness; non-distended  RECTAL (chaperoned by RN Shasta Faulkner): no hemorrhoids; minimal stool in vault, brown  EXTREMITIES: No cyanosis, clubbing, or edema  SKIN:  No rash  NEURO:  Alert and oriented x 3    Vital Signs:  Vital Signs Last 24 Hrs  T(C): 36.4 (2023 09:00), Max: 36.9 (2023 13:23)  T(F): 97.5 (2023 09:00), Max: 98.5 (2023 13:23)  HR: 52 (2023 09:00) (45 - 67)  BP: 92/55 (2023 09:00) (92/55 - 137/74)  BP(mean): 72 (2023 08:35) (72 - 100)  RR: 18 (2023 09:00) (14 - 18)  SpO2: 98% (2023 09:00) (97% - 100%)    Parameters below as of 2023 09:00  Patient On (Oxygen Delivery Method): room air      Daily Height in cm: 162.56 (2023 04:10)    Daily     LABS:                        12.2   7.21  )-----------( 317      ( 2023 17:39 )             37.8     Mean Cell Volume: 86.5 fl (23 @ 17:39)        139  |  106  |  10  ----------------------------<  80  4.0   |  23  |  0.56    Ca    9.6      2023 17:39    TPro  7.4  /  Alb  4.7  /  TBili  0.3  /  DBili  x   /  AST  19  /  ALT  14  /  AlkPhos  84      LIVER FUNCTIONS - ( 2023 17:39 )  Alb: 4.7 g/dL / Pro: 7.4 g/dL / ALK PHOS: 84 U/L / ALT: 14 U/L / AST: 19 U/L / GGT: x           PT/INR - ( 2023 03:53 )   PT: 14.1 sec;   INR: 1.22 ratio         PTT - ( 2023 03:53 )  PTT:29.9 sec  Urinalysis Basic - ( 2023 17:51 )    Color: Yellow / Appearance: Clear / S.027 / pH: x  Gluc: x / Ketone: Negative  / Bili: Negative / Urobili: Negative   Blood: x / Protein: Trace / Nitrite: Negative   Leuk Esterase: Negative / RBC: x / WBC x   Sq Epi: x / Non Sq Epi: x / Bacteria: x      Amylase Serum--      Lipase serum26       Ammonia--                          12.2   7.21  )-----------( 317      ( 2023 17:39 )             37.8

## 2023-07-19 NOTE — DISCHARGE NOTE PROVIDER - NSDCFUADDAPPT_GEN_ALL_CORE_FT
Follow-up with gastroenterology after discharge.  Please call office for appointment. Follow-up with Dr. Lewis (gastroenterology) after discharge.  Please call office for appointment.

## 2023-07-20 ENCOUNTER — TRANSCRIPTION ENCOUNTER (OUTPATIENT)
Age: 23
End: 2023-07-20

## 2023-07-20 VITALS
SYSTOLIC BLOOD PRESSURE: 92 MMHG | OXYGEN SATURATION: 98 % | DIASTOLIC BLOOD PRESSURE: 56 MMHG | HEART RATE: 45 BPM | TEMPERATURE: 98 F | RESPIRATION RATE: 18 BRPM

## 2023-07-20 LAB
ANION GAP SERPL CALC-SCNC: 10 MMOL/L — SIGNIFICANT CHANGE UP (ref 5–17)
BUN SERPL-MCNC: 12 MG/DL — SIGNIFICANT CHANGE UP (ref 7–23)
CALCIUM SERPL-MCNC: 8.4 MG/DL — SIGNIFICANT CHANGE UP (ref 8.4–10.5)
CHLORIDE SERPL-SCNC: 106 MMOL/L — SIGNIFICANT CHANGE UP (ref 96–108)
CO2 SERPL-SCNC: 22 MMOL/L — SIGNIFICANT CHANGE UP (ref 22–31)
CREAT SERPL-MCNC: 0.65 MG/DL — SIGNIFICANT CHANGE UP (ref 0.5–1.3)
EGFR: 128 ML/MIN/1.73M2 — SIGNIFICANT CHANGE UP
GLUCOSE SERPL-MCNC: 133 MG/DL — HIGH (ref 70–99)
HCT VFR BLD CALC: 32 % — LOW (ref 34.5–45)
HGB BLD-MCNC: 10.5 G/DL — LOW (ref 11.5–15.5)
MAGNESIUM SERPL-MCNC: 1.8 MG/DL — SIGNIFICANT CHANGE UP (ref 1.6–2.6)
MCHC RBC-ENTMCNC: 28.5 PG — SIGNIFICANT CHANGE UP (ref 27–34)
MCHC RBC-ENTMCNC: 32.8 GM/DL — SIGNIFICANT CHANGE UP (ref 32–36)
MCV RBC AUTO: 87 FL — SIGNIFICANT CHANGE UP (ref 80–100)
NRBC # BLD: 0 /100 WBCS — SIGNIFICANT CHANGE UP (ref 0–0)
PHOSPHATE SERPL-MCNC: 3.2 MG/DL — SIGNIFICANT CHANGE UP (ref 2.5–4.5)
PLATELET # BLD AUTO: 264 K/UL — SIGNIFICANT CHANGE UP (ref 150–400)
POTASSIUM SERPL-MCNC: 3.5 MMOL/L — SIGNIFICANT CHANGE UP (ref 3.5–5.3)
POTASSIUM SERPL-SCNC: 3.5 MMOL/L — SIGNIFICANT CHANGE UP (ref 3.5–5.3)
RBC # BLD: 3.68 M/UL — LOW (ref 3.8–5.2)
RBC # FLD: 12.8 % — SIGNIFICANT CHANGE UP (ref 10.3–14.5)
SODIUM SERPL-SCNC: 138 MMOL/L — SIGNIFICANT CHANGE UP (ref 135–145)
WBC # BLD: 10.08 K/UL — SIGNIFICANT CHANGE UP (ref 3.8–10.5)
WBC # FLD AUTO: 10.08 K/UL — SIGNIFICANT CHANGE UP (ref 3.8–10.5)

## 2023-07-20 PROCEDURE — 84295 ASSAY OF SERUM SODIUM: CPT

## 2023-07-20 PROCEDURE — C9399: CPT

## 2023-07-20 PROCEDURE — 82330 ASSAY OF CALCIUM: CPT

## 2023-07-20 PROCEDURE — 80053 COMPREHEN METABOLIC PANEL: CPT

## 2023-07-20 PROCEDURE — 83605 ASSAY OF LACTIC ACID: CPT

## 2023-07-20 PROCEDURE — 86901 BLOOD TYPING SEROLOGIC RH(D): CPT

## 2023-07-20 PROCEDURE — 84100 ASSAY OF PHOSPHORUS: CPT

## 2023-07-20 PROCEDURE — 84702 CHORIONIC GONADOTROPIN TEST: CPT

## 2023-07-20 PROCEDURE — 82803 BLOOD GASES ANY COMBINATION: CPT

## 2023-07-20 PROCEDURE — 93975 VASCULAR STUDY: CPT

## 2023-07-20 PROCEDURE — 76830 TRANSVAGINAL US NON-OB: CPT

## 2023-07-20 PROCEDURE — 82435 ASSAY OF BLOOD CHLORIDE: CPT

## 2023-07-20 PROCEDURE — 81003 URINALYSIS AUTO W/O SCOPE: CPT

## 2023-07-20 PROCEDURE — 85018 HEMOGLOBIN: CPT

## 2023-07-20 PROCEDURE — 96365 THER/PROPH/DIAG IV INF INIT: CPT

## 2023-07-20 PROCEDURE — 83690 ASSAY OF LIPASE: CPT

## 2023-07-20 PROCEDURE — 83735 ASSAY OF MAGNESIUM: CPT

## 2023-07-20 PROCEDURE — 99285 EMERGENCY DEPT VISIT HI MDM: CPT

## 2023-07-20 PROCEDURE — 36415 COLL VENOUS BLD VENIPUNCTURE: CPT

## 2023-07-20 PROCEDURE — 82947 ASSAY GLUCOSE BLOOD QUANT: CPT

## 2023-07-20 PROCEDURE — 80048 BASIC METABOLIC PNL TOTAL CA: CPT

## 2023-07-20 PROCEDURE — C1889: CPT

## 2023-07-20 PROCEDURE — 85730 THROMBOPLASTIN TIME PARTIAL: CPT

## 2023-07-20 PROCEDURE — 96366 THER/PROPH/DIAG IV INF ADDON: CPT

## 2023-07-20 PROCEDURE — 85027 COMPLETE CBC AUTOMATED: CPT

## 2023-07-20 PROCEDURE — 74177 CT ABD & PELVIS W/CONTRAST: CPT | Mod: MA

## 2023-07-20 PROCEDURE — 85014 HEMATOCRIT: CPT

## 2023-07-20 PROCEDURE — 84132 ASSAY OF SERUM POTASSIUM: CPT

## 2023-07-20 PROCEDURE — 86850 RBC ANTIBODY SCREEN: CPT

## 2023-07-20 PROCEDURE — 88304 TISSUE EXAM BY PATHOLOGIST: CPT

## 2023-07-20 PROCEDURE — 85610 PROTHROMBIN TIME: CPT

## 2023-07-20 PROCEDURE — 86900 BLOOD TYPING SEROLOGIC ABO: CPT

## 2023-07-20 PROCEDURE — 85025 COMPLETE CBC W/AUTO DIFF WBC: CPT

## 2023-07-20 RX ORDER — POTASSIUM CHLORIDE 20 MEQ
40 PACKET (EA) ORAL ONCE
Refills: 0 | Status: COMPLETED | OUTPATIENT
Start: 2023-07-20 | End: 2023-07-20

## 2023-07-20 RX ORDER — OXYCODONE HYDROCHLORIDE 5 MG/1
1 TABLET ORAL
Qty: 8 | Refills: 0
Start: 2023-07-20 | End: 2023-07-21

## 2023-07-20 RX ORDER — ACETAMINOPHEN 500 MG
3 TABLET ORAL
Qty: 0 | Refills: 0 | DISCHARGE
Start: 2023-07-20

## 2023-07-20 RX ADMIN — Medication 975 MILLIGRAM(S): at 06:22

## 2023-07-20 RX ADMIN — Medication 975 MILLIGRAM(S): at 12:10

## 2023-07-20 RX ADMIN — Medication 975 MILLIGRAM(S): at 05:22

## 2023-07-20 RX ADMIN — SODIUM CHLORIDE 500 MILLILITER(S): 9 INJECTION, SOLUTION INTRAVENOUS at 08:59

## 2023-07-20 RX ADMIN — Medication 15 MILLIGRAM(S): at 04:18

## 2023-07-20 RX ADMIN — Medication 40 MILLIEQUIVALENT(S): at 10:49

## 2023-07-20 RX ADMIN — Medication 975 MILLIGRAM(S): at 00:09

## 2023-07-20 RX ADMIN — OXYCODONE HYDROCHLORIDE 5 MILLIGRAM(S): 5 TABLET ORAL at 08:58

## 2023-07-20 RX ADMIN — ENOXAPARIN SODIUM 40 MILLIGRAM(S): 100 INJECTION SUBCUTANEOUS at 10:53

## 2023-07-20 RX ADMIN — Medication 15 MILLIGRAM(S): at 04:38

## 2023-07-20 NOTE — PROVIDER CONTACT NOTE (OTHER) - ACTION/TREATMENT ORDERED:
dr. trujillo stated will evaluate at bedside
Md aware, no new orders. Plan of care continues.
MD made aware, no AM labs needed for patient. Plan of care continues.
MD made aware, no new orders, MD to come to bedside to assess patient.

## 2023-07-20 NOTE — PROVIDER CONTACT NOTE (OTHER) - ASSESSMENT
BP 91/50, pt asymptomatic all other VSS.
JILLIAN. RN wants to confirm that AM labs are not needed.
VSS,pain medications given.

## 2023-07-20 NOTE — DISCHARGE NOTE NURSING/CASE MANAGEMENT/SOCIAL WORK - NURSING SECTION COMPLETE
Your pregnancy test today shows that you are in fact pregnant where unable at this time to determine how far along for this pregnancy.  You will need to make an appointment with an obstetrician - has not worked with an OB/GYN doctor before you may need to talk to your primary care doctor about a recommendation for an appointment.    If you have any pain, vaginal bleeding, lightheadedness or blackout please return to the emergency Department or see her doctor immediately.  You're being prescribed some Phenergan to control nausea   Patient/Caregiver provided printed discharge information.

## 2023-07-20 NOTE — DISCHARGE NOTE NURSING/CASE MANAGEMENT/SOCIAL WORK - NSFLUVACAGEDISCH_IMM_ALL_CORE
Adult 5-Fu Counseling: 5-Fluorouracil Counseling:  I discussed with the patient the risks of 5-fluorouracil including but not limited to erythema, scaling, itching, weeping, crusting, and pain.

## 2023-07-20 NOTE — PROVIDER CONTACT NOTE (OTHER) - RECOMMENDATIONS
MD made aware, no new orders.
Md aware, no new orders.
MD made aware, no AM labs needed for patient.

## 2023-07-20 NOTE — PROGRESS NOTE ADULT - ASSESSMENT
22yFemale patient S/P laparoscopic appendectomy (7/19)    Plan:  - Pain control PRN  - Diet: regular   - Activity: as tolerated   - DVT ppx  - d/c home    Acute care surgery, [id number]

## 2023-07-20 NOTE — DISCHARGE NOTE NURSING/CASE MANAGEMENT/SOCIAL WORK - PATIENT PORTAL LINK FT
You can access the FollowMyHealth Patient Portal offered by [facility] by registering at the following website: [email or web address]. By joining [employer]’s FollowMyHealth portal, you will also be able to view your health information using other applications (apps) compatible with our system.

## 2023-07-20 NOTE — PROVIDER CONTACT NOTE (OTHER) - REASON
bp 82/50 (manual-repeated bps), hr 54, r18, 95% rm air, 97.9; pt resting in bed asymptomatic for bp
No AM labs? RN wants to confirm that AM labs are not needed.
BP 91/50, pt asymptomatic all other VSS.
Pt in constant abd pain

## 2023-07-20 NOTE — PROGRESS NOTE ADULT - SUBJECTIVE AND OBJECTIVE BOX
SURGERY DAILY PROGRESS NOTE:       Subjective / Overnight events:  Tolerating diet, denies N/V.  Pain controlled.  Received 500cc LR bolus with improvement in BP, feels better.      Objective:      MEDICATIONS  (STANDING):  acetaminophen     Tablet .. 975 milliGRAM(s) Oral every 6 hours  enoxaparin Injectable 40 milliGRAM(s) SubCutaneous every 24 hours  potassium chloride    Tablet ER 40 milliEquivalent(s) Oral once    MEDICATIONS  (PRN):  ondansetron Injectable 4 milliGRAM(s) IV Push every 6 hours PRN Nausea and/or Vomiting  oxyCODONE    IR 5 milliGRAM(s) Oral every 6 hours PRN Severe Pain (7 - 10)      Vital Signs Last 24 Hrs  T(C): 36.8 (20 Jul 2023 08:19), Max: 37.9 (19 Jul 2023 20:09)  T(F): 98.2 (20 Jul 2023 08:19), Max: 100.2 (19 Jul 2023 20:09)  HR: 45 (20 Jul 2023 08:19) (45 - 61)  BP: 92/56 (20 Jul 2023 08:19) (82/45 - 97/46)  BP(mean): --  RR: 18 (20 Jul 2023 08:19) (18 - 18)  SpO2: 98% (20 Jul 2023 08:19) (95% - 100%)    Parameters below as of 20 Jul 2023 08:19  Patient On (Oxygen Delivery Method): room air        I&O's Detail    19 Jul 2023 07:01  -  20 Jul 2023 07:00  --------------------------------------------------------  IN:    Lactated Ringers Bolus: 500 mL    Oral Fluid: 240 mL  Total IN: 740 mL    OUT:    Voided (mL): 800 mL  Total OUT: 800 mL    Total NET: -60 mL      20 Jul 2023 07:01  -  20 Jul 2023 10:20  --------------------------------------------------------  IN:    Oral Fluid: 140 mL  Total IN: 140 mL    OUT:  Total OUT: 0 mL    Total NET: 140 mL          Daily     Daily     LABS:                        10.5   10.08 )-----------( 264      ( 20 Jul 2023 09:27 )             32.0     07-20    138  |  106  |  12  ----------------------------<  133<H>  3.5   |  22  |  0.65    Ca    8.4      20 Jul 2023 09:27  Phos  3.2     07-20  Mg     1.8     07-20    TPro  7.4  /  Alb  4.7  /  TBili  0.3  /  DBili  x   /  AST  19  /  ALT  14  /  AlkPhos  84  07-18    PT/INR - ( 19 Jul 2023 03:53 )   PT: 14.1 sec;   INR: 1.22 ratio         PTT - ( 19 Jul 2023 03:53 )  PTT:29.9 sec  Urinalysis Basic - ( 20 Jul 2023 09:27 )    Color: x / Appearance: x / SG: x / pH: x  Gluc: 133 mg/dL / Ketone: x  / Bili: x / Urobili: x   Blood: x / Protein: x / Nitrite: x   Leuk Esterase: x / RBC: x / WBC x   Sq Epi: x / Non Sq Epi: x / Bacteria: x        Physical Examination:  General: NAD, resting comfortably in bed  Respiratory: Nonlabored respirations  Cardio: pulse present   Abdomen: soft, nondistended, appropriately tender, port sites C/D/I  Vascular: extremities are warm and well perfused.

## 2023-07-20 NOTE — DISCHARGE NOTE NURSING/CASE MANAGEMENT/SOCIAL WORK - NSDCPEFALRISK_GEN_ALL_CORE
For information on Fall & Injury Prevention, visit: [email or web address] OR  [email or web address] OR  https://www.cdc.gov/steadi/patient.html

## 2023-07-27 LAB — SURGICAL PATHOLOGY STUDY: SIGNIFICANT CHANGE UP

## 2023-08-14 ENCOUNTER — APPOINTMENT (OUTPATIENT)
Dept: TRAUMA SURGERY | Facility: CLINIC | Age: 23
End: 2023-08-14
Payer: MEDICAID

## 2023-08-14 DIAGNOSIS — K35.80 UNSPECIFIED ACUTE APPENDICITIS: ICD-10-CM

## 2023-08-14 PROCEDURE — 99024 POSTOP FOLLOW-UP VISIT: CPT

## 2023-08-15 PROBLEM — K35.80 ACUTE APPENDICITIS, UNCOMPLICATED: Status: ACTIVE | Noted: 2023-08-15

## 2023-09-08 ENCOUNTER — APPOINTMENT (OUTPATIENT)
Dept: GASTROENTEROLOGY | Facility: CLINIC | Age: 23
End: 2023-09-08
Payer: MEDICAID

## 2023-09-08 VITALS
WEIGHT: 123 LBS | SYSTOLIC BLOOD PRESSURE: 109 MMHG | TEMPERATURE: 98.2 F | OXYGEN SATURATION: 98 % | HEIGHT: 61 IN | HEART RATE: 59 BPM | DIASTOLIC BLOOD PRESSURE: 72 MMHG | BODY MASS INDEX: 23.22 KG/M2

## 2023-09-08 DIAGNOSIS — K59.01 SLOW TRANSIT CONSTIPATION: ICD-10-CM

## 2023-09-08 DIAGNOSIS — K64.4 RESIDUAL HEMORRHOIDAL SKIN TAGS: ICD-10-CM

## 2023-09-08 PROCEDURE — 99213 OFFICE O/P EST LOW 20 MIN: CPT

## 2023-09-08 RX ORDER — POLYETHYLENE GLYCOL 3350 17 G/17G
17 POWDER, FOR SOLUTION ORAL DAILY
Qty: 30 | Refills: 0 | Status: ACTIVE | OUTPATIENT
Start: 2023-09-08

## 2023-09-08 NOTE — PHYSICAL EXAM
[Normal] : oriented to person, place, and time [Oriented To Time, Place, And Person] : oriented to person, place, and time

## 2023-09-08 NOTE — REVIEW OF SYSTEMS
[Abdominal Pain] : abdominal pain [Vomiting] : no vomiting [Constipation] : no constipation [Diarrhea] : no diarrhea [Heartburn] : no heartburn [Melena (black stool)] : no melena [Bleeding] : bleeding [Bloating (gassiness)] : no bloating [Negative] : Neurological

## 2023-09-08 NOTE — HISTORY OF PRESENT ILLNESS
[FreeTextEntry1] : Pt. is a 23y/o F with PMH of appedicectomy, who is here for low back pain and bloody stools last week. She had the pain a month after her recent appendicectomy. She denies smoking, drinking etoh or doing drugs.  A phone interpretor was used.  She is not sure of aggravating or relieving factors. She think the pain started when she was walking. She thinks the pain radiates to the front of her belly. She is now feeling better, she denies ongoing abdominal pain. She still has bloody stools with every bowel movement.  She was constipated for a whole month after her surgery then she went 1-2 bowel movements/day, but now is seeing blood in her stools.

## 2023-09-08 NOTE — ASSESSMENT
[FreeTextEntry1] : Pt. is a 21y/o F with PMH of recent appendicectomy, who is now having blood in her stools. She was constipated initially but now is having 2-3 BMs/day with blood in them. She denies weight loss, She was taking pain medications after her surgery. She is no longer taking vira-operative pain medications, she is taking femin only once a month. It is mefenamic acid. She doesnt drink 2L of water/day. We discussed how she needs to drink enough water/day. She will also try taking miralax as needed for her hard stools. She likely has hemorrhoids from her recent surgery and constipation. She will return to clinic in 4-6 weeks time if she is still having blood in stools or abdominal pain.  Andrea Law MD [facility]

## 2023-09-18 ENCOUNTER — APPOINTMENT (OUTPATIENT)
Dept: GASTROENTEROLOGY | Facility: CLINIC | Age: 23
End: 2023-09-18

## 2024-05-08 ENCOUNTER — LABORATORY RESULT (OUTPATIENT)
Age: 24
End: 2024-05-08

## 2024-05-08 ENCOUNTER — OUTPATIENT (OUTPATIENT)
Dept: OUTPATIENT SERVICES | Facility: HOSPITAL | Age: 24
LOS: 1 days | End: 2024-05-08
Payer: MEDICAID

## 2024-05-08 ENCOUNTER — APPOINTMENT (OUTPATIENT)
Dept: OBGYN | Facility: CLINIC | Age: 24
End: 2024-05-08
Payer: MEDICAID

## 2024-05-08 VITALS — WEIGHT: 130 LBS | DIASTOLIC BLOOD PRESSURE: 66 MMHG | SYSTOLIC BLOOD PRESSURE: 98 MMHG

## 2024-05-08 DIAGNOSIS — N76.0 ACUTE VAGINITIS: ICD-10-CM

## 2024-05-08 DIAGNOSIS — B96.89 ACUTE VAGINITIS: ICD-10-CM

## 2024-05-08 DIAGNOSIS — Z11.3 ENCOUNTER FOR SCREENING FOR INFECTIONS WITH A PREDOMINANTLY SEXUAL MODE OF TRANSMISSION: ICD-10-CM

## 2024-05-08 DIAGNOSIS — K08.409 PARTIAL LOSS OF TEETH, UNSPECIFIED CAUSE, UNSPECIFIED CLASS: Chronic | ICD-10-CM

## 2024-05-08 PROCEDURE — 99213 OFFICE O/P EST LOW 20 MIN: CPT

## 2024-05-08 RX ORDER — CLINDAMYCIN PHOSPHATE 100 MG/5G
2 GEL VAGINAL
Qty: 1 | Refills: 0 | Status: ACTIVE | COMMUNITY
Start: 2024-05-08 | End: 1900-01-01

## 2024-05-08 NOTE — PHYSICAL EXAM
[Labia Majora] : normal [Labia Minora] : normal [Discharge] : a  ~M vaginal discharge was present [No Bleeding] : There was no active vaginal bleeding [Normal] : normal [Normal Position] : in a normal position [Tenderness] : nontender [Uterine Adnexae] : normal [FreeTextEntry4] : + nitrazine

## 2024-05-08 NOTE — DISCUSSION/SUMMARY
[FreeTextEntry1] : 24yo G0- add on for vaginal burning/odor [ ] NG NAAT sent - Xaciato rx sent  RTC for annual gyn exam Filiberto [place]ham, PAC

## 2024-05-08 NOTE — REASON FOR VISIT
[Follow-Up] : a follow-up evaluation of [[employer] ] : provided by [employer]   [Time Spent: ____ minutes] : Total time spent using  services: [unfilled] minutes. The patient's primary language is not English thus required  services. [Interpreters_IDNumber] : [id number] [TWNoteComboBox1] : Citizen of [place]

## 2024-05-08 NOTE — HISTORY OF PRESENT ILLNESS
[FreeTextEntry1] : 22yo  (LMP 24) presents c/o 1 month of vaginal burning, odor. Denies itching or dysuria.   Denies new partner

## 2024-05-09 PROCEDURE — T1013: CPT

## 2024-05-09 PROCEDURE — G0463: CPT

## 2024-05-13 NOTE — HISTORY OF PRESENT ILLNESS
[FreeTextEntry8] : Ms. DAYSI REYES is a 23 year old Other race  female  with history of  presented today for pain in stomach and swelling and to establish a new PCP.   Medical Hx: Surgical Hx: Family Hx: Social Hx:   Allergy: Medication:

## 2024-05-14 ENCOUNTER — LABORATORY RESULT (OUTPATIENT)
Age: 24
End: 2024-05-14

## 2024-05-14 ENCOUNTER — APPOINTMENT (OUTPATIENT)
Dept: OBGYN | Facility: CLINIC | Age: 24
End: 2024-05-14
Payer: MEDICAID

## 2024-05-14 ENCOUNTER — APPOINTMENT (OUTPATIENT)
Dept: INTERNAL MEDICINE | Facility: CLINIC | Age: 24
End: 2024-05-14
Payer: MEDICAID

## 2024-05-14 ENCOUNTER — OUTPATIENT (OUTPATIENT)
Dept: OUTPATIENT SERVICES | Facility: HOSPITAL | Age: 24
LOS: 1 days | End: 2024-05-14

## 2024-05-14 ENCOUNTER — OUTPATIENT (OUTPATIENT)
Dept: OUTPATIENT SERVICES | Facility: HOSPITAL | Age: 24
LOS: 1 days | End: 2024-05-14
Payer: MEDICAID

## 2024-05-14 VITALS — WEIGHT: 128 LBS | SYSTOLIC BLOOD PRESSURE: 110 MMHG | DIASTOLIC BLOOD PRESSURE: 62 MMHG

## 2024-05-14 VITALS
HEIGHT: 61 IN | SYSTOLIC BLOOD PRESSURE: 100 MMHG | DIASTOLIC BLOOD PRESSURE: 68 MMHG | HEART RATE: 54 BPM | OXYGEN SATURATION: 97 % | BODY MASS INDEX: 24.55 KG/M2 | WEIGHT: 130 LBS

## 2024-05-14 DIAGNOSIS — Z01.419 ENCOUNTER FOR GYNECOLOGICAL EXAMINATION (GENERAL) (ROUTINE) W/OUT ABNORMAL FINDINGS: ICD-10-CM

## 2024-05-14 DIAGNOSIS — Z00.00 ENCOUNTER FOR GENERAL ADULT MEDICAL EXAMINATION W/OUT ABNORMAL FINDINGS: ICD-10-CM

## 2024-05-14 DIAGNOSIS — I10 ESSENTIAL (PRIMARY) HYPERTENSION: ICD-10-CM

## 2024-05-14 DIAGNOSIS — R10.84 GENERALIZED ABDOMINAL PAIN: ICD-10-CM

## 2024-05-14 DIAGNOSIS — N76.0 ACUTE VAGINITIS: ICD-10-CM

## 2024-05-14 DIAGNOSIS — B97.7 PAPILLOMAVIRUS AS THE CAUSE OF DISEASES CLASSIFIED ELSEWHERE: ICD-10-CM

## 2024-05-14 DIAGNOSIS — K08.409 PARTIAL LOSS OF TEETH, UNSPECIFIED CAUSE, UNSPECIFIED CLASS: Chronic | ICD-10-CM

## 2024-05-14 LAB
BILIRUB UR QL STRIP: NORMAL
CLARITY UR: CLEAR
COLLECTION METHOD: NORMAL
GLUCOSE UR-MCNC: NORMAL
HCG UR QL: 0.2 EU/DL
HGB UR QL STRIP.AUTO: NORMAL
KETONES UR-MCNC: NORMAL
LEUKOCYTE ESTERASE UR QL STRIP: NORMAL
NITRITE UR QL STRIP: NORMAL
PH UR STRIP: 6
PROT UR STRIP-MCNC: NORMAL
SP GR UR STRIP: 1.03

## 2024-05-14 PROCEDURE — 88141 CYTOPATH C/V INTERPRET: CPT

## 2024-05-14 PROCEDURE — G0444 DEPRESSION SCREEN ANNUAL: CPT | Mod: 59

## 2024-05-14 PROCEDURE — 99202 OFFICE O/P NEW SF 15 MIN: CPT | Mod: GE

## 2024-05-14 PROCEDURE — G0463: CPT

## 2024-05-14 PROCEDURE — 99395 PREV VISIT EST AGE 18-39: CPT

## 2024-05-14 NOTE — COUNSELING
[Vitamins/Supplements] : vitamins/supplements [Breast Self Exam] : breast self exam [Preconception Care/ Fertility options] : preconception care, fertility options [STD (testing, results, tx)] : STD (testing, results, tx) [HPV Vaccine] : HPV Vaccine

## 2024-05-14 NOTE — REASON FOR VISIT
[Annual] : an annual visit. [[employer] ] : provided by [employer]   [Time Spent: ____ minutes] : Total time spent using  services: [unfilled] minutes. The patient's primary language is not English thus required  services. [Interpreters_IDNumber] : [id number] [TWNoteComboBox1] : [place]

## 2024-05-14 NOTE — HISTORY OF PRESENT ILLNESS
[FreeTextEntry1] : 22yo  (LMP 24) presents for annual gyn exam.  Pt with recent BV infection (resolved w/ Xaciatio).  Pt reports monthly menses.  Monogamous, using rhythm method. Pt desires pregnancy later this year.     - PAP 10/22 ASCUS +HPV   - Gardasil -desires, but deferred today. states she will resched - Contraception- none  - PHQ9: 13-  denies SI/HI, does not feel "depressed" just occasionally overwhelmed.   [employer] has PCP appt this week.

## 2024-05-14 NOTE — PHYSICAL EXAM
[Appropriately responsive] : appropriately responsive [Alert] : alert [No Acute Distress] : no acute distress [No Lymphadenopathy] : no lymphadenopathy [Soft] : soft [Non-tender] : non-tender [Non-distended] : non-distended [No HSM] : No HSM [No Lesions] : no lesions [No Mass] : no mass [Oriented x3] : oriented x3 [Examination Of The Breasts] : a normal appearance [No Masses] : no breast masses were palpable [Labia Majora] : normal [Labia Minora] : normal [Pink Rugae] : pink rugae [No Bleeding] : There was no active vaginal bleeding [Normal] : normal [Normal Position] : in a normal position [Tenderness] : nontender [Enlarged ___ wks] : not enlarged [Uterine Adnexae] : normal

## 2024-05-14 NOTE — PHYSICAL EXAM
[No Acute Distress] : no acute distress [EOMI] : extraocular movements intact [Normal Outer Ear/Nose] : the outer ears and nose were normal in appearance [Supple] : supple [No Respiratory Distress] : no respiratory distress  [No Accessory Muscle Use] : no accessory muscle use [Clear to Auscultation] : lungs were clear to auscultation bilaterally [Normal Rate] : normal rate  [Regular Rhythm] : with a regular rhythm [Normal S1, S2] : normal S1 and S2 [Soft] : abdomen soft [Non Tender] : non-tender [Non-distended] : non-distended [Normal Bowel Sounds] : normal bowel sounds [Grossly Normal Strength/Tone] : grossly normal strength/tone [No Rash] : no rash [No Focal Deficits] : no focal deficits [Normal Affect] : the affect was normal [Normal Insight/Judgement] : insight and judgment were intact

## 2024-05-14 NOTE — DISCUSSION/SUMMARY
[FreeTextEntry1] : 24yo -  annual gyn exam - [ ]pap collected (follow up based on result) - gardasil- pt declined today, states she will reschedule - contraception- declined, desires pregnancy later this year.  Discussed PNV and timed intercourse.   - gen health followed by PCP- has appt this week - Depression screen- reviewed PHQ9 - pt scored 13.  denies si/hi, states she doesn't feel "depressed".  [ ]SW tasked to reach out to pt.  Warning signs reviewed.   - RTC for ronit/prn or with Pos preg test.  Filiberto [place], PAC

## 2024-05-15 ENCOUNTER — NON-APPOINTMENT (OUTPATIENT)
Age: 24
End: 2024-05-15

## 2024-05-15 NOTE — ASSESSMENT
[FreeTextEntry1] :  23F with no significant past medical history presents for CPE.  # Abdominal pain Patient with sxs of abdominal bloating/pain/burning  - H pylori pending - importance of diet discussed- no caffeine or spicy food - pt reports increased dairy intake- recommended to reduce  - pending labs: CBC, CMP  # Moderate depression - PHQ 10 today however patient states distress d/t abdominal pain; denies HI/SI - reports a safe and supportive enviroment  #HCM - pt deferred TDAP today - reports fully vaccinated as child - STI screening obtained today - cervical cancer scfreening done today with gyn team   Will return to clinic in 3months; instructed to f/u sooner if needed

## 2024-05-15 NOTE — HEALTH RISK ASSESSMENT
[1 or 2 (0 pts)] : 1 or 2 (0 points) [Never (0 pts)] : Never (0 points) [0] : 1) Little interest or pleasure doing things: Not at all (0) [1] : 2) Feeling down, depressed, or hopeless for several days (1) [PHQ-2 Negative - No further assessment needed] : PHQ-2 Negative - No further assessment needed [With Significant Other] : lives with significant other [Unemployed] : unemployed [Sexually Active] : sexually active [Never] : Never [PHQ-2 Positive] : PHQ-2 Positive [PHQ-9 Positive] : PHQ-9 Positive [de-identified] : healthy diet consisting of carbs/ veggies/ fruits

## 2024-05-15 NOTE — REVIEW OF SYSTEMS
[Abdominal Pain] : abdominal pain [Nausea] : nausea [Heartburn] : heartburn [Negative] : Heme/Lymph [Constipation] : no constipation [Diarrhea] : diarrhea [Vomiting] : no vomiting [Melena] : no melena

## 2024-05-15 NOTE — HISTORY OF PRESENT ILLNESS
[de-identified] : 23F with no significant past medical history presents for CPE.   Today reports abdominal pain. 1 year ago had appendectomy however states a different type of pain began 2 months ago. Pain is throughout the day. Now reports abdominal bloating and increased gas. States pain is mid/lower quadrant of the stomach. Reports no remitting factors. Worsened by increased activity as walking. Reports increased pain in the evenings after dinner. Heavy foods as meats it worsens the pain. Reports nausea with the pain and occasional difficulty breathing with the pain. Reports sour/bitter taste in the AM. Reports burning sensation in the stomach. Denies consuming spicy foods. Reports occasionally drinking coffee in the week. Reports eating a balanced meal. Denies smoking or drinking alcohol. Patient is currently sexually active with 1 partner. Does not use condoms. LMP  (period lasts 5 days). Reports occasional blood on stool (likely hemorrhoids). Denies hematuria. Pt seen by gyn team today - cervical cancer screen complete. Dx with BV last week now dysuria improved. Reports consuming large amounts of dairy products daily.   Medical hx: external hemorrhoids Surgical hx: appendectomy, D+C (spontaneous  10/2022) Allergies: none Medications: None Family hx: no hx of cancer  # HCM

## 2024-05-15 NOTE — INTERPRETER SERVICES
[[employer] ] : provided by [employer]   [Time Spent: ____ minutes] : Total time spent using  services: [unfilled] minutes. The patient's primary language is not English thus required  services. [Interpreters_IDNumber] : [id number] [Interpreters_FullName] : Jerry

## 2024-05-16 DIAGNOSIS — B97.7 PAPILLOMAVIRUS AS THE CAUSE OF DISEASES CLASSIFIED ELSEWHERE: ICD-10-CM

## 2024-05-16 DIAGNOSIS — Z01.419 ENCOUNTER FOR GYNECOLOGICAL EXAMINATION (GENERAL) (ROUTINE) WITHOUT ABNORMAL FINDINGS: ICD-10-CM

## 2024-05-16 LAB
ALBUMIN SERPL ELPH-MCNC: 4.7 G/DL
ALP BLD-CCNC: 93 U/L
ALT SERPL-CCNC: 10 U/L
ANION GAP SERPL CALC-SCNC: 14 MMOL/L
AST SERPL-CCNC: 16 U/L
BILIRUB SERPL-MCNC: 0.2 MG/DL
BUN SERPL-MCNC: 13 MG/DL
CALCIUM SERPL-MCNC: 9.5 MG/DL
CHLORIDE SERPL-SCNC: 106 MMOL/L
CO2 SERPL-SCNC: 23 MMOL/L
CREAT SERPL-MCNC: 0.79 MG/DL
EGFR: 108 ML/MIN/1.73M2
GLUCOSE SERPL-MCNC: 90 MG/DL
HBV CORE IGG+IGM SER QL: NONREACTIVE
HBV SURFACE AB SER QL: NONREACTIVE
HBV SURFACE AG SER QL: NONREACTIVE
HCT VFR BLD CALC: 38.7 %
HCV AB SER QL: NONREACTIVE
HCV S/CO RATIO: 0.08 S/CO
HGB BLD-MCNC: 12.1 G/DL
HIV1+2 AB SPEC QL IA.RAPID: NONREACTIVE
MCHC RBC-ENTMCNC: 27.8 PG
MCHC RBC-ENTMCNC: 31.3 GM/DL
MCV RBC AUTO: 88.8 FL
PLATELET # BLD AUTO: 366 K/UL
POTASSIUM SERPL-SCNC: 4.6 MMOL/L
PROT SERPL-MCNC: 7 G/DL
RBC # BLD: 4.36 M/UL
RBC # FLD: 12.9 %
SODIUM SERPL-SCNC: 143 MMOL/L
WBC # FLD AUTO: 7.51 K/UL

## 2024-05-17 LAB — H PYLORI AG STL QL: NEGATIVE

## 2024-05-20 ENCOUNTER — LABORATORY RESULT (OUTPATIENT)
Age: 24
End: 2024-05-20

## 2024-05-20 LAB — CYTOLOGY SPEC DOC CYTO: SIGNIFICANT CHANGE UP

## 2024-05-20 PROCEDURE — T1013: CPT

## 2024-05-20 PROCEDURE — G0463: CPT

## 2024-05-20 PROCEDURE — 86706 HEP B SURFACE ANTIBODY: CPT

## 2024-05-20 PROCEDURE — 87389 HIV-1 AG W/HIV-1&-2 AB AG IA: CPT

## 2024-05-20 PROCEDURE — 80053 COMPREHEN METABOLIC PANEL: CPT

## 2024-05-20 PROCEDURE — 87625 HPV TYPES 16 & 18 ONLY: CPT

## 2024-05-20 PROCEDURE — 87624 HPV HI-RISK TYP POOLED RSLT: CPT

## 2024-05-20 PROCEDURE — 87338 HPYLORI STOOL AG IA: CPT

## 2024-05-20 PROCEDURE — 85027 COMPLETE CBC AUTOMATED: CPT

## 2024-05-20 PROCEDURE — 86803 HEPATITIS C AB TEST: CPT

## 2024-05-20 PROCEDURE — 87340 HEPATITIS B SURFACE AG IA: CPT

## 2024-05-20 PROCEDURE — 86704 HEP B CORE ANTIBODY TOTAL: CPT

## 2024-05-20 PROCEDURE — 87491 CHLMYD TRACH DNA AMP PROBE: CPT

## 2024-05-20 PROCEDURE — 87591 N.GONORRHOEAE DNA AMP PROB: CPT

## 2024-05-20 PROCEDURE — 81003 URINALYSIS AUTO W/O SCOPE: CPT

## 2024-05-20 PROCEDURE — 88175 CYTOPATH C/V AUTO FLUID REDO: CPT

## 2024-05-25 NOTE — PRE-ANESTHESIA EVALUATION ADULT - NS MD HP INPLANTS MED DEV
None on 3L NC at home. Not on CPAP. Hospital course complicated w/ AHRF 2/2 to flash pulm edema requiring intubation. Now extubated and stable on NC    - continue to monitor.

## 2024-09-18 ENCOUNTER — LABORATORY RESULT (OUTPATIENT)
Age: 24
End: 2024-09-18

## 2024-09-18 ENCOUNTER — APPOINTMENT (OUTPATIENT)
Dept: OBGYN | Facility: CLINIC | Age: 24
End: 2024-09-18
Payer: SELF-PAY

## 2024-09-18 VITALS
SYSTOLIC BLOOD PRESSURE: 110 MMHG | WEIGHT: 123 LBS | HEIGHT: 61 IN | BODY MASS INDEX: 23.22 KG/M2 | DIASTOLIC BLOOD PRESSURE: 70 MMHG

## 2024-09-18 DIAGNOSIS — B37.31 ACUTE CANDIDIASIS OF VULVA AND VAGINA: ICD-10-CM

## 2024-09-18 PROCEDURE — 99213 OFFICE O/P EST LOW 20 MIN: CPT | Mod: GC

## 2024-09-18 RX ORDER — FLUCONAZOLE 150 MG/1
150 TABLET ORAL
Qty: 2 | Refills: 0 | Status: ACTIVE | COMMUNITY
Start: 2024-09-18 | End: 1900-01-01

## 2024-09-19 NOTE — HISTORY OF PRESENT ILLNESS
[FreeTextEntry1] : [place] Intrepreter #[id number] Esther Cartwright DR 24yo  LMP 2024 presents for vaginal itchiness and vaginal discharge for the past 2 weeks. Reports discharge is white and chunky. No VB. No fevers/chills    OBHx:    SAB w/ D&C 10/2022   GynHx: LMP 2024, regular, 5 days, moderate flow   Denies history of fibroids, cysts, abnromal paps or STIs   Currently sexually active with 1 partner, male, does not use birth control or barrier protection   PMHx: external hemorrhoids   SurgHx: appendectomy   Meds: denies  All: denies   SHx: no etoh, drug, tobacco use. Lives in [place] and not working right now. Sometimes works cleaning houses.   Psych: denies   HCM  Pap smear 2024, ASCUS, HPV16 negative, HPV high risk reflex genotype mrna alpha detected  Gardasil desires but deferred at last visit 2024  PHQ9-13, seen by social work at 2024 visit

## 2024-09-19 NOTE — PHYSICAL EXAM
[Chaperone Present] : A chaperone was present in the examining room during all aspects of the physical examination [[id number]] : A chaperone was present during the pelvic exam. [Appropriately responsive] : appropriately responsive [Alert] : alert [No Acute Distress] : no acute distress [Soft] : soft [Non-tender] : non-tender [Non-distended] : non-distended [Oriented x3] : oriented x3 [Labia Majora] : normal [Labia Minora] : normal [Discharge] : a  ~M vaginal discharge was present [Moderate] : moderate [Foul Smelling] : foul smelling [White] : white [Cheesy] : cheesy [Normal] : normal [Normal Position] : in a normal position [Uterine Adnexae] : normal [Tenderness] : nontender [FreeTextEntry4] : minimal erythema at vaginal introitus

## 2024-09-19 NOTE — PLAN
[FreeTextEntry1] : A/P: 22 yo  LMP 2024 presents for vaginal itchiness discharge for the past 2 weeks most likely due to yeast infection. Patient in stable condition.    #Vaginal pruritis/vaginal discharge    - White, chunky vaginal discharge seen on exam today thus high clinical suspicion for candida infection. Patient 22 yo thus G/C as well. BV also sent as well.   - Rx for Diflucan 150mg [id number]   - F/u G/C/BV results    #HCM   - Repeat Pap in 1 year   - Gardasil vaccine when patient desires   - RTC for annual or sooner prn    D/w Dr. Aixa Robert, PGY1

## 2024-09-19 NOTE — PLAN
[FreeTextEntry1] : A/P: 24 yo  LMP 2024 presents for vaginal itchiness discharge for the past 2 weeks most likely due to yeast infection. Patient in stable condition.    #Vaginal pruritis/vaginal discharge    - White, chunky vaginal discharge seen on exam today thus high clinical suspicion for candida infection. Patient 24 yo thus G/C as well. BV also sent as well.   - Rx for Diflucan 150mg [id number]   - F/u G/C/BV results    #HCM   - Repeat Pap in 1 year   - Gardasil vaccine when patient desires   - RTC for annual or sooner prn    D/w Dr. Aixa Robert, PGY1

## 2024-09-19 NOTE — HISTORY OF PRESENT ILLNESS
[FreeTextEntry1] : [place] Intrepreter #[id number] Esther Cartwright DR 22yo  LMP 2024 presents for vaginal itchiness and vaginal discharge for the past 2 weeks. Reports discharge is white and chunky. No VB. No fevers/chills    OBHx:    SAB w/ D&C 10/2022   GynHx: LMP 2024, regular, 5 days, moderate flow   Denies history of fibroids, cysts, abnromal paps or STIs   Currently sexually active with 1 partner, male, does not use birth control or barrier protection   PMHx: external hemorrhoids   SurgHx: appendectomy   Meds: denies  All: denies   SHx: no etoh, drug, tobacco use. Lives in [place] and not working right now. Sometimes works cleaning houses.   Psych: denies   HCM  Pap smear 2024, ASCUS, HPV16 negative, HPV high risk reflex genotype mrna alpha detected  Gardasil desires but deferred at last visit 2024  PHQ9-13, seen by social work at 2024 visit

## 2024-09-19 NOTE — PHYSICAL EXAM
[Chaperone Present] : A chaperone was present in the examining room during all aspects of the physical examination [05097] : A chaperone was present during the pelvic exam. [Appropriately responsive] : appropriately responsive [Alert] : alert [No Acute Distress] : no acute distress [Soft] : soft [Non-tender] : non-tender [Non-distended] : non-distended [Oriented x3] : oriented x3 [Labia Majora] : normal [Labia Minora] : normal [Discharge] : a  ~M vaginal discharge was present [Moderate] : moderate [Foul Smelling] : foul smelling [White] : white [Cheesy] : cheesy [Normal] : normal [Normal Position] : in a normal position [Uterine Adnexae] : normal [Tenderness] : nontender [FreeTextEntry4] : minimal erythema at vaginal introitus

## 2024-09-20 DIAGNOSIS — N76.0 ACUTE VAGINITIS: ICD-10-CM

## 2024-09-20 DIAGNOSIS — B96.89 ACUTE VAGINITIS: ICD-10-CM

## 2024-09-20 RX ORDER — METRONIDAZOLE 7.5 MG/G
0.75 GEL VAGINAL
Qty: 1 | Refills: 0 | Status: ACTIVE | COMMUNITY
Start: 2024-09-20 | End: 1900-01-01

## 2024-10-24 ENCOUNTER — APPOINTMENT (OUTPATIENT)
Dept: OBGYN | Facility: CLINIC | Age: 24
End: 2024-10-24
Payer: COMMERCIAL

## 2024-10-24 ENCOUNTER — LABORATORY RESULT (OUTPATIENT)
Age: 24
End: 2024-10-24

## 2024-10-24 ENCOUNTER — OUTPATIENT (OUTPATIENT)
Dept: OUTPATIENT SERVICES | Facility: HOSPITAL | Age: 24
LOS: 1 days | End: 2024-10-24
Payer: SELF-PAY

## 2024-10-24 VITALS — SYSTOLIC BLOOD PRESSURE: 112 MMHG | DIASTOLIC BLOOD PRESSURE: 82 MMHG | WEIGHT: 122 LBS

## 2024-10-24 DIAGNOSIS — N30.00 ACUTE CYSTITIS W/OUT HEMATURIA: ICD-10-CM

## 2024-10-24 DIAGNOSIS — B37.31 ACUTE CANDIDIASIS OF VULVA AND VAGINA: ICD-10-CM

## 2024-10-24 DIAGNOSIS — K08.409 PARTIAL LOSS OF TEETH, UNSPECIFIED CAUSE, UNSPECIFIED CLASS: Chronic | ICD-10-CM

## 2024-10-24 DIAGNOSIS — N30.00 ACUTE CYSTITIS WITHOUT HEMATURIA: ICD-10-CM

## 2024-10-24 DIAGNOSIS — N76.0 ACUTE VAGINITIS: ICD-10-CM

## 2024-10-24 DIAGNOSIS — Z11.3 ENCOUNTER FOR SCREENING FOR INFECTIONS WITH A PREDOMINANTLY SEXUAL MODE OF TRANSMISSION: ICD-10-CM

## 2024-10-24 PROCEDURE — T1013: CPT

## 2024-10-24 PROCEDURE — 81513 NFCT DS BV RNA VAG FLU ALG: CPT

## 2024-10-24 PROCEDURE — G0463: CPT

## 2024-10-24 PROCEDURE — 87591 N.GONORRHOEAE DNA AMP PROB: CPT

## 2024-10-24 PROCEDURE — 99213 OFFICE O/P EST LOW 20 MIN: CPT

## 2024-10-24 PROCEDURE — 87661 TRICHOMONAS VAGINALIS AMPLIF: CPT

## 2024-10-24 PROCEDURE — 87491 CHLMYD TRACH DNA AMP PROBE: CPT

## 2024-10-24 PROCEDURE — 87481 CANDIDA DNA AMP PROBE: CPT

## 2024-10-24 RX ORDER — FLUCONAZOLE 150 MG/1
150 TABLET ORAL
Qty: 3 | Refills: 2 | Status: ACTIVE | COMMUNITY
Start: 2024-10-24 | End: 1900-01-01

## 2024-10-24 RX ORDER — NITROFURANTOIN (MONOHYDRATE/MACROCRYSTALS) 25; 75 MG/1; MG/1
100 CAPSULE ORAL TWICE DAILY
Qty: 20 | Refills: 0 | Status: ACTIVE | COMMUNITY
Start: 2024-10-24 | End: 1900-01-01

## 2024-10-25 LAB
BV BACTERIA RRNA VAG QL NAA+PROBE: DETECTED
C GLABRATA RNA VAG QL NAA+PROBE: SIGNIFICANT CHANGE UP
C TRACH RRNA SPEC QL NAA+PROBE: SIGNIFICANT CHANGE UP
CANDIDA RRNA VAG QL PROBE: DETECTED
N GONORRHOEA RRNA SPEC QL NAA+PROBE: SIGNIFICANT CHANGE UP
T VAGINALIS RRNA SPEC QL NAA+PROBE: SIGNIFICANT CHANGE UP

## 2024-11-22 ENCOUNTER — OUTPATIENT (OUTPATIENT)
Dept: OUTPATIENT SERVICES | Facility: HOSPITAL | Age: 24
LOS: 1 days | End: 2024-11-22
Payer: SELF-PAY

## 2024-11-22 ENCOUNTER — LABORATORY RESULT (OUTPATIENT)
Age: 24
End: 2024-11-22

## 2024-11-22 ENCOUNTER — APPOINTMENT (OUTPATIENT)
Dept: OBGYN | Facility: CLINIC | Age: 24
End: 2024-11-22
Payer: COMMERCIAL

## 2024-11-22 VITALS — SYSTOLIC BLOOD PRESSURE: 110 MMHG | WEIGHT: 123 LBS | DIASTOLIC BLOOD PRESSURE: 70 MMHG

## 2024-11-22 DIAGNOSIS — N89.8 OTHER SPECIFIED NONINFLAMMATORY DISORDERS OF VAGINA: ICD-10-CM

## 2024-11-22 DIAGNOSIS — B37.31 ACUTE CANDIDIASIS OF VULVA AND VAGINA: ICD-10-CM

## 2024-11-22 DIAGNOSIS — K08.409 PARTIAL LOSS OF TEETH, UNSPECIFIED CAUSE, UNSPECIFIED CLASS: Chronic | ICD-10-CM

## 2024-11-22 DIAGNOSIS — R30.0 DYSURIA: ICD-10-CM

## 2024-11-22 DIAGNOSIS — N76.0 ACUTE VAGINITIS: ICD-10-CM

## 2024-11-22 PROCEDURE — 87591 N.GONORRHOEAE DNA AMP PROB: CPT

## 2024-11-22 PROCEDURE — G0463: CPT

## 2024-11-22 PROCEDURE — 87491 CHLMYD TRACH DNA AMP PROBE: CPT

## 2024-11-22 PROCEDURE — 87086 URINE CULTURE/COLONY COUNT: CPT

## 2024-11-22 PROCEDURE — 81513 NFCT DS BV RNA VAG FLU ALG: CPT

## 2024-11-22 PROCEDURE — 99213 OFFICE O/P EST LOW 20 MIN: CPT

## 2024-11-22 PROCEDURE — 87661 TRICHOMONAS VAGINALIS AMPLIF: CPT

## 2024-11-22 PROCEDURE — 87481 CANDIDA DNA AMP PROBE: CPT

## 2024-11-22 RX ORDER — TERCONAZOLE 8 MG/G
0.8 CREAM VAGINAL DAILY
Qty: 1 | Refills: 0 | Status: ACTIVE | COMMUNITY
Start: 2024-11-22 | End: 1900-01-01

## 2024-11-23 ENCOUNTER — LABORATORY RESULT (OUTPATIENT)
Age: 24
End: 2024-11-23

## 2024-11-23 LAB
CULTURE RESULTS: SIGNIFICANT CHANGE UP
SPECIMEN SOURCE: SIGNIFICANT CHANGE UP

## 2024-11-25 ENCOUNTER — OUTPATIENT (OUTPATIENT)
Dept: OUTPATIENT SERVICES | Facility: HOSPITAL | Age: 24
LOS: 1 days | End: 2024-11-25
Payer: SELF-PAY

## 2024-11-25 ENCOUNTER — APPOINTMENT (OUTPATIENT)
Dept: INTERNAL MEDICINE | Facility: CLINIC | Age: 24
End: 2024-11-25
Payer: COMMERCIAL

## 2024-11-25 VITALS
DIASTOLIC BLOOD PRESSURE: 64 MMHG | WEIGHT: 123 LBS | HEIGHT: 61 IN | HEART RATE: 71 BPM | SYSTOLIC BLOOD PRESSURE: 104 MMHG | BODY MASS INDEX: 23.22 KG/M2 | OXYGEN SATURATION: 98 %

## 2024-11-25 DIAGNOSIS — B37.31 ACUTE CANDIDIASIS OF VULVA AND VAGINA: ICD-10-CM

## 2024-11-25 DIAGNOSIS — I10 ESSENTIAL (PRIMARY) HYPERTENSION: ICD-10-CM

## 2024-11-25 DIAGNOSIS — K08.409 PARTIAL LOSS OF TEETH, UNSPECIFIED CAUSE, UNSPECIFIED CLASS: Chronic | ICD-10-CM

## 2024-11-25 PROCEDURE — 83036 HEMOGLOBIN GLYCOSYLATED A1C: CPT

## 2024-11-25 PROCEDURE — G0463: CPT

## 2024-11-25 PROCEDURE — 99213 OFFICE O/P EST LOW 20 MIN: CPT | Mod: GC

## 2024-11-25 RX ORDER — METRONIDAZOLE 500 MG/1
500 TABLET ORAL TWICE DAILY
Qty: 14 | Refills: 0 | Status: ACTIVE | COMMUNITY
Start: 2024-11-25 | End: 1900-01-01

## 2024-11-29 LAB
ESTIMATED AVERAGE GLUCOSE: 114 MG/DL
HBA1C MFR BLD HPLC: 5.6 %

## 2024-12-03 DIAGNOSIS — B37.31 ACUTE CANDIDIASIS OF VULVA AND VAGINA: ICD-10-CM

## 2024-12-04 DIAGNOSIS — B37.31 ACUTE CANDIDIASIS OF VULVA AND VAGINA: ICD-10-CM

## 2024-12-04 DIAGNOSIS — R30.0 DYSURIA: ICD-10-CM

## 2024-12-04 DIAGNOSIS — N89.8 OTHER SPECIFIED NONINFLAMMATORY DISORDERS OF VAGINA: ICD-10-CM

## 2025-01-21 NOTE — ED ADULT TRIAGE NOTE - NS ED NURSE BANDS TYPE
REASON FOR VISIT:    Janie Main is a 24 APRN at [facility] in [place].  Patient was diagnosed by a psychiatrist at [facility] and counseling in [facility].  Name band;

## 2025-02-11 DIAGNOSIS — B37.31 ACUTE CANDIDIASIS OF VULVA AND VAGINA: ICD-10-CM

## 2025-02-11 RX ORDER — TERCONAZOLE 8 MG/G
0.8 CREAM VAGINAL
Qty: 1 | Refills: 0 | Status: ACTIVE | COMMUNITY
Start: 2025-02-11 | End: 1900-01-01

## 2025-02-26 ENCOUNTER — OUTPATIENT (OUTPATIENT)
Dept: OUTPATIENT SERVICES | Facility: HOSPITAL | Age: 25
LOS: 1 days | End: 2025-02-26
Payer: SELF-PAY

## 2025-02-26 ENCOUNTER — LABORATORY RESULT (OUTPATIENT)
Age: 25
End: 2025-02-26

## 2025-02-26 ENCOUNTER — APPOINTMENT (OUTPATIENT)
Dept: OBGYN | Facility: CLINIC | Age: 25
End: 2025-02-26
Payer: COMMERCIAL

## 2025-02-26 DIAGNOSIS — K08.409 PARTIAL LOSS OF TEETH, UNSPECIFIED CAUSE, UNSPECIFIED CLASS: Chronic | ICD-10-CM

## 2025-02-26 DIAGNOSIS — N76.0 ACUTE VAGINITIS: ICD-10-CM

## 2025-02-26 PROCEDURE — 87591 N.GONORRHOEAE DNA AMP PROB: CPT

## 2025-02-26 PROCEDURE — 87661 TRICHOMONAS VAGINALIS AMPLIF: CPT

## 2025-02-26 PROCEDURE — 99213 OFFICE O/P EST LOW 20 MIN: CPT | Mod: GC

## 2025-02-26 PROCEDURE — G0463: CPT

## 2025-02-26 PROCEDURE — T1013: CPT

## 2025-02-26 PROCEDURE — 87491 CHLMYD TRACH DNA AMP PROBE: CPT

## 2025-02-26 PROCEDURE — 81513 NFCT DS BV RNA VAG FLU ALG: CPT

## 2025-02-26 PROCEDURE — 87481 CANDIDA DNA AMP PROBE: CPT

## 2025-02-26 RX ORDER — METRONIDAZOLE 500 MG/1
500 TABLET ORAL TWICE DAILY
Qty: 14 | Refills: 0 | Status: ACTIVE | COMMUNITY
Start: 2025-02-26 | End: 1900-01-01

## 2025-02-26 RX ORDER — FLUCONAZOLE 150 MG/1
150 TABLET ORAL AS DIRECTED
Qty: 15 | Refills: 0 | Status: ACTIVE | COMMUNITY
Start: 2025-02-26 | End: 1900-01-01

## 2025-02-27 LAB
BV BACTERIA RRNA VAG QL NAA+PROBE: SIGNIFICANT CHANGE UP
C GLABRATA RNA VAG QL NAA+PROBE: SIGNIFICANT CHANGE UP
C TRACH RRNA SPEC QL NAA+PROBE: SIGNIFICANT CHANGE UP
CANDIDA RRNA VAG QL PROBE: SIGNIFICANT CHANGE UP
N GONORRHOEA RRNA SPEC QL NAA+PROBE: SIGNIFICANT CHANGE UP
T VAGINALIS RRNA SPEC QL NAA+PROBE: SIGNIFICANT CHANGE UP

## 2025-03-04 DIAGNOSIS — B37.31 ACUTE CANDIDIASIS OF VULVA AND VAGINA: ICD-10-CM

## 2025-03-31 ENCOUNTER — OUTPATIENT (OUTPATIENT)
Dept: OUTPATIENT SERVICES | Facility: HOSPITAL | Age: 25
LOS: 1 days | End: 2025-03-31
Payer: SELF-PAY

## 2025-03-31 ENCOUNTER — APPOINTMENT (OUTPATIENT)
Dept: OBGYN | Facility: CLINIC | Age: 25
End: 2025-03-31
Payer: COMMERCIAL

## 2025-03-31 DIAGNOSIS — N89.8 OTHER SPECIFIED NONINFLAMMATORY DISORDERS OF VAGINA: ICD-10-CM

## 2025-03-31 DIAGNOSIS — K08.409 PARTIAL LOSS OF TEETH, UNSPECIFIED CAUSE, UNSPECIFIED CLASS: Chronic | ICD-10-CM

## 2025-03-31 DIAGNOSIS — Z01.419 ENCOUNTER FOR GYNECOLOGICAL EXAMINATION (GENERAL) (ROUTINE) W/OUT ABNORMAL FINDINGS: ICD-10-CM

## 2025-03-31 DIAGNOSIS — N76.0 ACUTE VAGINITIS: ICD-10-CM

## 2025-03-31 DIAGNOSIS — N90.89 OTHER SPECIFIED NONINFLAMMATORY DISORDERS OF VULVA AND PERINEUM: ICD-10-CM

## 2025-03-31 PROCEDURE — G0463: CPT

## 2025-03-31 PROCEDURE — 99213 OFFICE O/P EST LOW 20 MIN: CPT | Mod: 25

## 2025-03-31 PROCEDURE — T1013: CPT

## 2025-03-31 RX ORDER — CLOTRIMAZOLE AND BETAMETHASONE DIPROPIONATE 10; .5 MG/G; MG/G
1-0.05 CREAM TOPICAL TWICE DAILY
Qty: 1 | Refills: 1 | Status: ACTIVE | COMMUNITY
Start: 2025-03-31 | End: 1900-01-01

## 2025-04-11 DIAGNOSIS — N89.8 OTHER SPECIFIED NONINFLAMMATORY DISORDERS OF VAGINA: ICD-10-CM

## 2025-04-11 DIAGNOSIS — N90.89 OTHER SPECIFIED NONINFLAMMATORY DISORDERS OF VULVA AND PERINEUM: ICD-10-CM

## 2025-04-28 ENCOUNTER — EMERGENCY (EMERGENCY)
Facility: HOSPITAL | Age: 25
LOS: 1 days | End: 2025-04-28
Attending: STUDENT IN AN ORGANIZED HEALTH CARE EDUCATION/TRAINING PROGRAM
Payer: SELF-PAY

## 2025-04-28 VITALS
HEART RATE: 81 BPM | DIASTOLIC BLOOD PRESSURE: 89 MMHG | RESPIRATION RATE: 18 BRPM | OXYGEN SATURATION: 98 % | HEIGHT: 59.06 IN | WEIGHT: 125 LBS | SYSTOLIC BLOOD PRESSURE: 124 MMHG | TEMPERATURE: 98 F

## 2025-04-28 DIAGNOSIS — K08.409 PARTIAL LOSS OF TEETH, UNSPECIFIED CAUSE, UNSPECIFIED CLASS: Chronic | ICD-10-CM

## 2025-04-28 PROCEDURE — 11740 EVACUATION SUBUNGUAL HMTMA: CPT

## 2025-04-28 PROCEDURE — 99283 EMERGENCY DEPT VISIT LOW MDM: CPT | Mod: 25

## 2025-04-28 NOTE — ED ADULT TRIAGE NOTE - GLASGOW COMA SCALE: SCORE, MLM
Fax received. On Dr. Dockery's desk for signature.   
MD signed. Form was faxed.     Called  home to confirm they received form. They confirmed. No further action needed at this time.   
Marzena from [facility] home calling.    Marezna wondering if Dr. Dockery will be signing patients death certificate.  Dr. Dockery okay with this.  Marzena faxing death certificate to our office to be signed. Awaiting fax.  
Ra Martinez  1938  Preferred Contact Number: [phone]    [employer] is following up on the status of previous request/ VERIFIED FAX NUMBER/ SHE WILL RE-FAX.        Please call.    
Will watch for fax.   
15

## 2025-04-29 VITALS
SYSTOLIC BLOOD PRESSURE: 120 MMHG | OXYGEN SATURATION: 97 % | TEMPERATURE: 98 F | HEART RATE: 72 BPM | DIASTOLIC BLOOD PRESSURE: 76 MMHG | RESPIRATION RATE: 18 BRPM

## 2025-04-29 PROCEDURE — 73130 X-RAY EXAM OF HAND: CPT | Mod: 26,RT

## 2025-04-29 PROCEDURE — 11740 EVACUATION SUBUNGUAL HMTMA: CPT | Mod: F5

## 2025-04-29 PROCEDURE — 99284 EMERGENCY DEPT VISIT MOD MDM: CPT | Mod: 25

## 2025-04-29 PROCEDURE — 73130 X-RAY EXAM OF HAND: CPT

## 2025-04-29 RX ORDER — IBUPROFEN 200 MG
400 TABLET ORAL ONCE
Refills: 0 | Status: COMPLETED | OUTPATIENT
Start: 2025-04-29 | End: 2025-04-29

## 2025-04-29 RX ORDER — ACETAMINOPHEN 500 MG/5ML
650 LIQUID (ML) ORAL ONCE
Refills: 0 | Status: COMPLETED | OUTPATIENT
Start: 2025-04-29 | End: 2025-04-29

## 2025-04-29 RX ADMIN — Medication 650 MILLIGRAM(S): at 02:35

## 2025-04-29 RX ADMIN — Medication 400 MILLIGRAM(S): at 02:35

## 2025-04-29 NOTE — ED PROVIDER NOTE - CLINICAL SUMMARY MEDICAL DECISION MAKING FREE TEXT BOX
Yolanda Valverde, ED Attendin-year-old female presenting with injury of the right thumb.  Slammed her car door onto it.  On exam, vital signs stable, swelling of that digit noted, with subungual hematoma.  Plan for x-ray and pain control to assess for any underlying fracture.  Would benefit from trephination given patient within the 48-hour window.  Reassess dispo

## 2025-04-29 NOTE — ED PROCEDURE NOTE - ATTENDING CONTRIBUTION TO CARE
I have personally discussed the indications, risks and benefits of the above procedure with the resident. I was present for key portions of the procedure and assisted when required. Yolanda Valverde, ED Attending

## 2025-04-29 NOTE — ED PROVIDER NOTE - NSFOLLOWUPINSTRUCTIONS_ED_ALL_ED_FT
You were seen for right thumb injury.  You had x-ray done this did not show any signs of fracture.  We have trephinated your thumb nail bed.  This will drain for little bit in terms of the blood however this was stopped at some point.  Your nail may fall off due to the injury.  You can follow-up with hand doctor outpatient in about 7 days or so.  Please return to emergency room for fever, worsening pain, numbness tingling, or any new/concerning symptoms.

## 2025-04-29 NOTE — ED ADULT NURSE NOTE - NSFALLCONCLUSION_ED_ALL_ED
The Service to ortopedics order in workqueue [id number] requested on 8/7/2018 has been removed as, unable to contact. Ordering provider has been notified.    Please contact patient, if further communication is needed.        
Universal Safety Interventions

## 2025-04-29 NOTE — ED PROVIDER NOTE - OBJECTIVE STATEMENT
24-year-old female, no known past medical history presenting with chief complaint of right-sided thumb pain after slamming the car door onto the finger on Sunday morning.  Denies any other injuries.  Increasing swelling and pain since.  Has not taken any medication today for symptoms.

## 2025-04-29 NOTE — ED ADULT NURSE NOTE - NSFALLUNIVINTERV_ED_ALL_ED
Bed/Stretcher in lowest position, wheels locked, appropriate side rails in place/Call bell, personal items and telephone in reach/Instruct patient to call for assistance before getting out of bed/chair/stretcher/Non-slip footwear applied when patient is off stretcher/[place] to call system/Physically safe environment - no spills, clutter or unnecessary equipment/Purposeful proactive rounding/Room/bathroom lighting operational, light cord in reach

## 2025-04-29 NOTE — ED PROVIDER NOTE - NS ED MD DISPO DISCHARGE
Tried to pull pt down to perform EEG, KoryMcKenzie, his nurse, called & said pt is refusing the test.  Neuro notified. Thank you. reduce reaction to psychosocial stressors/recognize triggers for onset of symptoms/prevent relapse of symptoms/develop improved health habits Home

## 2025-04-29 NOTE — ED PROVIDER NOTE - INTERNATIONAL TRAVEL
Jim to follow up with Primary Care provider regarding elevated blood pressure.  Blood pressure rechecked after visit    142/80  Shonna Kenny CMA Rheumatology  2/17/2020 8:27 AM                                RAPID3 (0-30) Cumulative Score  0          RAPID3 Weighted Score (divide #4 by 3 and that is the weighted score)  0     Shonna Kenny CMA Rheumatology  2/17/2020 7:39 AM           No

## 2025-04-29 NOTE — ED ADULT NURSE NOTE - OBJECTIVE STATEMENT
0115  23 y/o f to er ambulatory w/c/o R thumb pain swelling and ecchymosis to nail bed x 1d s/ closing the finger in a door.no loss of sensation or ROM.

## 2025-04-29 NOTE — ED PROVIDER NOTE - PATIENT PORTAL LINK FT
You can access the FollowMyHealth Patient Portal offered by [facility] by registering at the following website: [email or web address]. By joining [employer]’s FollowMyHealth portal, you will also be able to view your health information using other applications (apps) compatible with our system.

## 2025-04-29 NOTE — ED PROVIDER NOTE - CARE PROVIDER_API CALL
Balwinder Joyce  Plastic Surgery  [address] [place] [address] [address]  [place] [address]  Phone: [phone]  Fax: [phone]  Follow Up Time: 4-6 Days

## 2025-04-29 NOTE — ED PROVIDER NOTE - PHYSICAL EXAMINATION
Const: Well-nourished, Well-developed, appearing stated age.  Eyes: no conjunctival injection, and symmetrical lids.  HEENT: Head NCAT, no lesions. Atraumatic external nose and ears.   Neck: Symmetric, trachea midline.   CVS: +S1/S2, Radial pulses 2+ b/l.   RESP: Unlabored respiratory effort.   GI: Nontender/Nondistended,   MSK: +R thumb swelling with subungual hematoma   Skin: Warm, dry and intact.   Neuro: Fluent Speech. Moving all extremities.   Psych: Awake, Alert, & Oriented (AAO) x3. Appropriate mood and affect.

## 2025-04-29 NOTE — ED PROVIDER NOTE - PROGRESS NOTE DETAILS
Aristeo Saab, PGY3 -Trephination of nailbed done on the right thumb.  Blood was expressed.  Patient tolerated procedure very well.  Will discharge with hand surgery follow-up.

## 2025-05-20 ENCOUNTER — APPOINTMENT (OUTPATIENT)
Dept: INTERNAL MEDICINE | Facility: CLINIC | Age: 25
End: 2025-05-20

## (undated) DEVICE — GLV 7 PROTEXIS (WHITE)

## (undated) DEVICE — VACUUM CURETTE BERKLEY OLYMPUS CURVED 8MM

## (undated) DEVICE — GLV 8 PROTEXIS (WHITE)

## (undated) DEVICE — LIGASURE IMPACT

## (undated) DEVICE — DRSG OPSITE 2.5 X 2"

## (undated) DEVICE — SOL IRR POUR NS 0.9% 500ML

## (undated) DEVICE — DRSG TEGADERM 6"X8"

## (undated) DEVICE — GLV 6.5 PROTEXIS (WHITE)

## (undated) DEVICE — TUBING STRYKEFLOW II SUCTION / IRRIGATOR

## (undated) DEVICE — PACK LITHOTOMY

## (undated) DEVICE — TROCAR COVIDIEN VERSAPORT BLADELESS OPTICAL 5MM

## (undated) DEVICE — TUBING UTERINE ASPIRATION 3/8" X 6FT W/O ADAPTER

## (undated) DEVICE — INSUFFLATION NDL COVIDIEN STEP 14G FOR STEP/VERSASTEP

## (undated) DEVICE — VENODYNE/SCD SLEEVE CALF LARGE

## (undated) DEVICE — VACUUM CURETTE BERKLEY OLYMPUS CURVED 16MM X 1/2"

## (undated) DEVICE — DRAPE 1/2 SHEET 40X57"

## (undated) DEVICE — SUT BIOSYN 4-0 18" P-12

## (undated) DEVICE — DRAPE LIGHT HANDLE COVER (GREEN)

## (undated) DEVICE — DISSECTOR ENDO PEANUT 5MM

## (undated) DEVICE — SOL IRR POUR H2O 250ML

## (undated) DEVICE — ENDOCATCH 10MM SPECIMEN POUCH

## (undated) DEVICE — SOCK SPECIMEN 3/8"-1/2" MALE PORT

## (undated) DEVICE — GLV 8.5 PROTEXIS (WHITE)

## (undated) DEVICE — DRAPE GENERAL ENDOSCOPY

## (undated) DEVICE — VACUUM CURETTE BUSSE HOSP CURVED 14MM

## (undated) DEVICE — LIGASURE BLUNT TIP 37CM

## (undated) DEVICE — DRAPE INSTRUMENT POUCH 6.75" X 11"

## (undated) DEVICE — TUBING IRRIGATION DAVOL SYSTEM X STREAM

## (undated) DEVICE — SUT POLYSORB 0 30" GS-23

## (undated) DEVICE — PREP CHLORAPREP HI-LITE ORANGE 26ML

## (undated) DEVICE — SPECIMEN CONTAINER 100ML

## (undated) DEVICE — TROCAR COVIDIEN BLUNT TIP HASSAN 12MM

## (undated) DEVICE — TROCAR COVIDIEN STEP 5MM SHORT 70MM

## (undated) DEVICE — VACUUM CURETTE BERKLEY OLYMPUS F TIP 6MM

## (undated) DEVICE — GOWN TRIMAX LG

## (undated) DEVICE — VACUUM CURETTE BUSSE HOSP CURVED 9MM

## (undated) DEVICE — TROCAR COVIDIEN VERSASTEP 5MM SHORT

## (undated) DEVICE — VACUUM CURETTE BERKLEY OLYMPUS CURVED 12MM

## (undated) DEVICE — GLV 7.5 PROTEXIS (WHITE)

## (undated) DEVICE — VACUUM CURETTE BERKLEY OLYMPUS CURVED 7MM

## (undated) DEVICE — VACUUM CURETTE BUSSE HOSP CURVED 11MM

## (undated) DEVICE — INSUFFLATION NDL COVIDIEN STEP 14G SHORT FOR STEP/VERSASTEP

## (undated) DEVICE — LIGASURE MARYLAND 37CM

## (undated) DEVICE — WARMING BLANKET UPPER ADULT

## (undated) DEVICE — BLADE SCALPEL SAFETYLOCK #10

## (undated) DEVICE — VACUUM CURETTE BERKLEY OLYMPUS CURVED 9MM

## (undated) DEVICE — VACUUM CURETTE BERKLEY OLYMPUS CURVED 11MM

## (undated) DEVICE — DRAPE TOWEL BLUE 17" X 24"

## (undated) DEVICE — TROCAR APPLIED MEDICAL KII BALLOON BLUNT TIP 12MM X 100MM

## (undated) DEVICE — VACUUM CURETTE BUSSE HOSP CURVED 10MM

## (undated) DEVICE — VALVE YELLOW PORT SEAL PLUS 5MM

## (undated) DEVICE — VACUUM CURETTE MEDGYN CURVED 13MM

## (undated) DEVICE — SCOPE WARMER SEAL DISP

## (undated) DEVICE — VACUUM CURETTE BUSSE HOSP CURVED 12MM

## (undated) DEVICE — PACK LAP CHOLE LAP APPENDECTOMY

## (undated) DEVICE — TROCAR COVIDIEN VERSASTEP PLUS 12MM STANDARD

## (undated) DEVICE — TROCAR COVIDIEN VERSASTEP PLUS 11MM STANDARD

## (undated) DEVICE — POSITIONER FOAM EGG CRATE ULNAR 2PCS (PINK)

## (undated) DEVICE — BLADE SCALPEL SAFETYLOCK #15

## (undated) DEVICE — MEDICATION LABELS W MARKER

## (undated) DEVICE — VACUUM CURETTE BUSSE HOSP STRAIGHT 7MM

## (undated) DEVICE — TROCAR COVIDIEN BLUNT TIP HASSAN 10MM

## (undated) DEVICE — DRSG STERISTRIPS 0.5 X 4"

## (undated) DEVICE — STAPLER COVIDIEN ENDO GIA STANDARD HANDLE

## (undated) DEVICE — TROCAR APPLIED MEDICAL KII BALLOON BLUNT TIP 12MM X 130MM

## (undated) DEVICE — TUBING INSUFFLATION LAP FILTER 10FT

## (undated) DEVICE — TROCAR ETHICON ENDOPATH XCEL BLADELESS 5MM X 100MM STABILITY

## (undated) DEVICE — D HELP - CLEARVIEW CLEARIFY SYSTEM